# Patient Record
Sex: FEMALE | Race: WHITE | NOT HISPANIC OR LATINO | Employment: PART TIME | URBAN - METROPOLITAN AREA
[De-identification: names, ages, dates, MRNs, and addresses within clinical notes are randomized per-mention and may not be internally consistent; named-entity substitution may affect disease eponyms.]

---

## 2018-05-08 ENCOUNTER — OFFICE VISIT - HEALTHEAST (OUTPATIENT)
Dept: FAMILY MEDICINE | Facility: CLINIC | Age: 63
End: 2018-05-08

## 2018-05-08 ENCOUNTER — COMMUNICATION - HEALTHEAST (OUTPATIENT)
Dept: TELEHEALTH | Facility: CLINIC | Age: 63
End: 2018-05-08

## 2018-05-08 ENCOUNTER — HOSPITAL ENCOUNTER (OUTPATIENT)
Dept: MAMMOGRAPHY | Facility: CLINIC | Age: 63
Discharge: HOME | End: 2018-05-08

## 2018-05-08 DIAGNOSIS — Z13.1 SCREENING FOR DIABETES MELLITUS: ICD-10-CM

## 2018-05-08 DIAGNOSIS — Z12.31 VISIT FOR SCREENING MAMMOGRAM: ICD-10-CM

## 2018-05-08 DIAGNOSIS — Z13.220 SCREENING FOR LIPID DISORDERS: ICD-10-CM

## 2018-05-08 DIAGNOSIS — E66.9 OBESITY: ICD-10-CM

## 2018-05-08 DIAGNOSIS — R07.9 CHEST PAIN: ICD-10-CM

## 2018-05-08 DIAGNOSIS — Z00.00 ROUTINE HEALTH MAINTENANCE: ICD-10-CM

## 2018-05-08 LAB
ANION GAP SERPL CALCULATED.3IONS-SCNC: 10 MMOL/L (ref 5–18)
BUN SERPL-MCNC: 22 MG/DL (ref 8–22)
CALCIUM SERPL-MCNC: 9.6 MG/DL (ref 8.5–10.5)
CHLORIDE BLD-SCNC: 106 MMOL/L (ref 98–107)
CHOLEST SERPL-MCNC: 230 MG/DL
CO2 SERPL-SCNC: 24 MMOL/L (ref 22–31)
CREAT SERPL-MCNC: 0.95 MG/DL (ref 0.6–1.1)
FASTING STATUS PATIENT QL REPORTED: YES
GFR SERPL CREATININE-BSD FRML MDRD: 60 ML/MIN/1.73M2
GLUCOSE BLD-MCNC: 87 MG/DL (ref 70–125)
HDLC SERPL-MCNC: 62 MG/DL
LDLC SERPL CALC-MCNC: 143 MG/DL
POTASSIUM BLD-SCNC: 5.3 MMOL/L (ref 3.5–5)
SODIUM SERPL-SCNC: 140 MMOL/L (ref 136–145)
TRIGL SERPL-MCNC: 125 MG/DL

## 2018-05-08 ASSESSMENT — MIFFLIN-ST. JEOR: SCORE: 1850.9

## 2019-07-01 ENCOUNTER — OFFICE VISIT - HEALTHEAST (OUTPATIENT)
Dept: FAMILY MEDICINE | Facility: CLINIC | Age: 64
End: 2019-07-01

## 2019-07-01 DIAGNOSIS — J06.9 UPPER RESPIRATORY TRACT INFECTION, UNSPECIFIED TYPE: ICD-10-CM

## 2019-07-03 ENCOUNTER — AMBULATORY - HEALTHEAST (OUTPATIENT)
Dept: OTHER | Facility: CLINIC | Age: 64
End: 2019-07-03

## 2019-12-22 ENCOUNTER — OFFICE VISIT - HEALTHEAST (OUTPATIENT)
Dept: FAMILY MEDICINE | Facility: CLINIC | Age: 64
End: 2019-12-22

## 2019-12-22 DIAGNOSIS — Z23 NEED FOR INFLUENZA VACCINATION: ICD-10-CM

## 2019-12-22 DIAGNOSIS — N39.41 URGE INCONTINENCE OF URINE: ICD-10-CM

## 2019-12-22 DIAGNOSIS — N30.00 ACUTE CYSTITIS WITHOUT HEMATURIA: ICD-10-CM

## 2019-12-22 LAB
ALBUMIN UR-MCNC: NEGATIVE MG/DL
APPEARANCE UR: ABNORMAL
BACTERIA #/AREA URNS HPF: ABNORMAL HPF
BILIRUB UR QL STRIP: NEGATIVE
COLOR UR AUTO: YELLOW
GLUCOSE UR STRIP-MCNC: NEGATIVE MG/DL
HGB UR QL STRIP: ABNORMAL
KETONES UR STRIP-MCNC: NEGATIVE MG/DL
LEUKOCYTE ESTERASE UR QL STRIP: ABNORMAL
NITRATE UR QL: POSITIVE
PH UR STRIP: 6 [PH] (ref 5–8)
RBC #/AREA URNS AUTO: ABNORMAL HPF
SP GR UR STRIP: 1.02 (ref 1–1.03)
SQUAMOUS #/AREA URNS AUTO: ABNORMAL LPF
UROBILINOGEN UR STRIP-ACNC: ABNORMAL
WBC #/AREA URNS AUTO: ABNORMAL HPF

## 2019-12-24 LAB — BACTERIA SPEC CULT: ABNORMAL

## 2020-10-28 ENCOUNTER — OFFICE VISIT - HEALTHEAST (OUTPATIENT)
Dept: INTERNAL MEDICINE | Facility: CLINIC | Age: 65
End: 2020-10-28

## 2020-10-28 DIAGNOSIS — R03.0 ELEVATED BLOOD PRESSURE READING WITHOUT DIAGNOSIS OF HYPERTENSION: ICD-10-CM

## 2020-10-28 DIAGNOSIS — Z86.19 H/O COLD SORES: ICD-10-CM

## 2020-10-28 DIAGNOSIS — E66.01 MORBID OBESITY (H): ICD-10-CM

## 2020-10-28 ASSESSMENT — PATIENT HEALTH QUESTIONNAIRE - PHQ9: SUM OF ALL RESPONSES TO PHQ QUESTIONS 1-9: 0

## 2020-10-30 ENCOUNTER — COMMUNICATION - HEALTHEAST (OUTPATIENT)
Dept: INTERNAL MEDICINE | Facility: CLINIC | Age: 65
End: 2020-10-30

## 2020-10-30 LAB
HSV1 IGG SERPL QL IA: POSITIVE
HSV2 IGG SERPL QL IA: ABNORMAL

## 2020-11-11 ENCOUNTER — OFFICE VISIT - HEALTHEAST (OUTPATIENT)
Dept: INTERNAL MEDICINE | Facility: CLINIC | Age: 65
End: 2020-11-11

## 2020-11-11 DIAGNOSIS — I10 ESSENTIAL HYPERTENSION: ICD-10-CM

## 2020-11-11 DIAGNOSIS — H61.22 IMPACTED CERUMEN OF LEFT EAR: ICD-10-CM

## 2020-12-02 ENCOUNTER — COMMUNICATION - HEALTHEAST (OUTPATIENT)
Dept: INTERNAL MEDICINE | Facility: CLINIC | Age: 65
End: 2020-12-02

## 2020-12-02 DIAGNOSIS — I10 ESSENTIAL HYPERTENSION: ICD-10-CM

## 2021-01-21 ENCOUNTER — HOSPITAL ENCOUNTER (OUTPATIENT)
Dept: MAMMOGRAPHY | Facility: CLINIC | Age: 66
Discharge: HOME | End: 2021-01-21

## 2021-01-21 DIAGNOSIS — Z12.31 VISIT FOR SCREENING MAMMOGRAM: ICD-10-CM

## 2021-01-25 ENCOUNTER — HOSPITAL ENCOUNTER (OUTPATIENT)
Dept: MAMMOGRAPHY | Facility: CLINIC | Age: 66
Discharge: HOME | End: 2021-01-25

## 2021-01-25 ENCOUNTER — AMBULATORY - HEALTHEAST (OUTPATIENT)
Dept: MAMMOGRAPHY | Facility: CLINIC | Age: 66
End: 2021-01-25

## 2021-01-25 DIAGNOSIS — R92.0 BREAST MICROCALCIFICATIONS: ICD-10-CM

## 2021-01-25 DIAGNOSIS — Z11.59 ENCOUNTER FOR SCREENING FOR OTHER VIRAL DISEASES: ICD-10-CM

## 2021-01-29 ENCOUNTER — AMBULATORY - HEALTHEAST (OUTPATIENT)
Dept: FAMILY MEDICINE | Facility: CLINIC | Age: 66
End: 2021-01-29

## 2021-01-29 DIAGNOSIS — Z11.59 ENCOUNTER FOR SCREENING FOR OTHER VIRAL DISEASES: ICD-10-CM

## 2021-01-30 ENCOUNTER — COMMUNICATION - HEALTHEAST (OUTPATIENT)
Dept: SCHEDULING | Facility: CLINIC | Age: 66
End: 2021-01-30

## 2021-01-30 LAB
SARS-COV-2 PCR COMMENT: NORMAL
SARS-COV-2 RNA SPEC QL NAA+PROBE: NEGATIVE
SARS-COV-2 VIRUS SPECIMEN SOURCE: NORMAL

## 2021-02-01 ENCOUNTER — HOSPITAL ENCOUNTER (OUTPATIENT)
Dept: MAMMOGRAPHY | Facility: CLINIC | Age: 66
Discharge: HOME | End: 2021-02-01

## 2021-02-01 DIAGNOSIS — R92.0 BREAST MICROCALCIFICATIONS: ICD-10-CM

## 2021-02-02 ENCOUNTER — COMMUNICATION - HEALTHEAST (OUTPATIENT)
Dept: ONCOLOGY | Facility: HOSPITAL | Age: 66
End: 2021-02-02

## 2021-02-02 LAB
LAB AP CHARGES (HE HISTORICAL CONVERSION): NORMAL
PATH REPORT.COMMENTS IMP SPEC: NORMAL
PATH REPORT.COMMENTS IMP SPEC: NORMAL
PATH REPORT.FINAL DX SPEC: NORMAL
PATH REPORT.GROSS SPEC: NORMAL
PATH REPORT.MICROSCOPIC SPEC OTHER STN: NORMAL
PATH REPORT.RELEVANT HX SPEC: NORMAL
RESULT FLAG (HE HISTORICAL CONVERSION): NORMAL

## 2021-02-08 ENCOUNTER — HOSPITAL ENCOUNTER (OUTPATIENT)
Dept: SURGERY | Facility: CLINIC | Age: 66
Discharge: HOME | End: 2021-02-08
Attending: SURGERY

## 2021-02-08 DIAGNOSIS — D05.12 NEOPLASM OF LEFT BREAST, PRIMARY TUMOR STAGING CATEGORY TIS: DUCTAL CARCINOMA IN SITU (DCIS): ICD-10-CM

## 2021-02-08 ASSESSMENT — MIFFLIN-ST. JEOR: SCORE: 1821.42

## 2021-02-09 ENCOUNTER — AMBULATORY - HEALTHEAST (OUTPATIENT)
Dept: SURGERY | Facility: CLINIC | Age: 66
End: 2021-02-09

## 2021-02-09 ENCOUNTER — COMMUNICATION - HEALTHEAST (OUTPATIENT)
Dept: SURGERY | Facility: CLINIC | Age: 66
End: 2021-02-09

## 2021-02-09 DIAGNOSIS — Z11.59 ENCOUNTER FOR SCREENING FOR OTHER VIRAL DISEASES: ICD-10-CM

## 2021-02-15 ENCOUNTER — COMMUNICATION - HEALTHEAST (OUTPATIENT)
Dept: SURGERY | Facility: CLINIC | Age: 66
End: 2021-02-15

## 2021-02-17 ENCOUNTER — COMMUNICATION - HEALTHEAST (OUTPATIENT)
Dept: SURGERY | Facility: CLINIC | Age: 66
End: 2021-02-17

## 2021-02-18 ENCOUNTER — COMMUNICATION - HEALTHEAST (OUTPATIENT)
Dept: SURGERY | Facility: CLINIC | Age: 66
End: 2021-02-18

## 2021-02-18 ENCOUNTER — AMBULATORY - HEALTHEAST (OUTPATIENT)
Dept: NURSING | Facility: CLINIC | Age: 66
End: 2021-02-18

## 2021-02-18 ENCOUNTER — OFFICE VISIT - HEALTHEAST (OUTPATIENT)
Dept: FAMILY MEDICINE | Facility: CLINIC | Age: 66
End: 2021-02-18

## 2021-02-18 DIAGNOSIS — I10 ESSENTIAL HYPERTENSION: ICD-10-CM

## 2021-02-18 DIAGNOSIS — E66.01 MORBID OBESITY (H): ICD-10-CM

## 2021-02-18 DIAGNOSIS — I45.10 RBBB: ICD-10-CM

## 2021-02-18 DIAGNOSIS — Z01.818 PREOP GENERAL PHYSICAL EXAM: ICD-10-CM

## 2021-02-18 DIAGNOSIS — D05.12 NEOPLASM OF LEFT BREAST, PRIMARY TUMOR STAGING CATEGORY TIS: DUCTAL CARCINOMA IN SITU (DCIS): ICD-10-CM

## 2021-02-18 LAB
ANION GAP SERPL CALCULATED.3IONS-SCNC: 8 MMOL/L (ref 5–18)
ATRIAL RATE - MUSE: 79 BPM
BUN SERPL-MCNC: 16 MG/DL (ref 8–22)
CALCIUM SERPL-MCNC: 8.8 MG/DL (ref 8.5–10.5)
CHLORIDE BLD-SCNC: 105 MMOL/L (ref 98–107)
CO2 SERPL-SCNC: 26 MMOL/L (ref 22–31)
CREAT SERPL-MCNC: 1.02 MG/DL (ref 0.6–1.1)
DIASTOLIC BLOOD PRESSURE - MUSE: NORMAL
ERYTHROCYTE [DISTWIDTH] IN BLOOD BY AUTOMATED COUNT: 12.7 % (ref 11–14.5)
GFR SERPL CREATININE-BSD FRML MDRD: 55 ML/MIN/1.73M2
GLUCOSE BLD-MCNC: 84 MG/DL (ref 70–125)
HCT VFR BLD AUTO: 44 % (ref 35–47)
HGB BLD-MCNC: 14.2 G/DL (ref 12–16)
INTERPRETATION ECG - MUSE: NORMAL
MCH RBC QN AUTO: 29.8 PG (ref 27–34)
MCHC RBC AUTO-ENTMCNC: 32.3 G/DL (ref 32–36)
MCV RBC AUTO: 92 FL (ref 80–100)
P AXIS - MUSE: 37 DEGREES
PLATELET # BLD AUTO: 297 THOU/UL (ref 140–440)
PMV BLD AUTO: 10.5 FL (ref 7–10)
POTASSIUM BLD-SCNC: 4.9 MMOL/L (ref 3.5–5)
PR INTERVAL - MUSE: 134 MS
QRS DURATION - MUSE: 130 MS
QT - MUSE: 390 MS
QTC - MUSE: 447 MS
R AXIS - MUSE: 5 DEGREES
RBC # BLD AUTO: 4.77 MILL/UL (ref 3.8–5.4)
SODIUM SERPL-SCNC: 139 MMOL/L (ref 136–145)
SYSTOLIC BLOOD PRESSURE - MUSE: NORMAL
T AXIS - MUSE: 11 DEGREES
VENTRICULAR RATE- MUSE: 79 BPM
WBC: 5.7 THOU/UL (ref 4–11)

## 2021-02-18 ASSESSMENT — MIFFLIN-ST. JEOR: SCORE: 1823.68

## 2021-02-19 ENCOUNTER — COMMUNICATION - HEALTHEAST (OUTPATIENT)
Dept: SURGERY | Facility: CLINIC | Age: 66
End: 2021-02-19

## 2021-02-19 ENCOUNTER — AMBULATORY - HEALTHEAST (OUTPATIENT)
Dept: MAMMOGRAPHY | Facility: CLINIC | Age: 66
End: 2021-02-19

## 2021-02-19 DIAGNOSIS — Z11.59 ENCOUNTER FOR SCREENING FOR OTHER VIRAL DISEASES: ICD-10-CM

## 2021-02-22 ENCOUNTER — AMBULATORY - HEALTHEAST (OUTPATIENT)
Dept: LAB | Facility: CLINIC | Age: 66
End: 2021-02-22

## 2021-02-22 ENCOUNTER — COMMUNICATION - HEALTHEAST (OUTPATIENT)
Dept: SURGERY | Facility: CLINIC | Age: 66
End: 2021-02-22

## 2021-02-22 DIAGNOSIS — Z11.59 ENCOUNTER FOR SCREENING FOR OTHER VIRAL DISEASES: ICD-10-CM

## 2021-02-23 ENCOUNTER — COMMUNICATION - HEALTHEAST (OUTPATIENT)
Dept: SCHEDULING | Facility: CLINIC | Age: 66
End: 2021-02-23

## 2021-02-25 ENCOUNTER — COMMUNICATION - HEALTHEAST (OUTPATIENT)
Dept: INTERNAL MEDICINE | Facility: CLINIC | Age: 66
End: 2021-02-25

## 2021-02-25 ENCOUNTER — ANESTHESIA - HEALTHEAST (OUTPATIENT)
Dept: SURGERY | Facility: AMBULATORY SURGERY CENTER | Age: 66
End: 2021-02-25

## 2021-02-25 DIAGNOSIS — I10 ESSENTIAL HYPERTENSION: ICD-10-CM

## 2021-02-25 RX ORDER — LISINOPRIL 5 MG/1
TABLET ORAL
Qty: 90 TABLET | Refills: 3 | Status: SHIPPED | OUTPATIENT
Start: 2021-02-25 | End: 2022-02-09

## 2021-02-25 ASSESSMENT — MIFFLIN-ST. JEOR: SCORE: 1821.42

## 2021-02-26 ENCOUNTER — HOSPITAL ENCOUNTER (OUTPATIENT)
Dept: MAMMOGRAPHY | Facility: CLINIC | Age: 66
Discharge: HOME | End: 2021-02-26
Attending: SURGERY

## 2021-02-26 ENCOUNTER — SURGERY - HEALTHEAST (OUTPATIENT)
Dept: SURGERY | Facility: AMBULATORY SURGERY CENTER | Age: 66
End: 2021-02-26

## 2021-02-26 DIAGNOSIS — D05.12 NEOPLASM OF LEFT BREAST, PRIMARY TUMOR STAGING CATEGORY TIS: DUCTAL CARCINOMA IN SITU (DCIS): ICD-10-CM

## 2021-02-26 ASSESSMENT — MIFFLIN-ST. JEOR: SCORE: 1821.42

## 2021-03-15 ENCOUNTER — RECORDS - HEALTHEAST (OUTPATIENT)
Dept: ADMINISTRATIVE | Facility: OTHER | Age: 66
End: 2021-03-15

## 2021-03-15 ENCOUNTER — COMMUNICATION - HEALTHEAST (OUTPATIENT)
Dept: ONCOLOGY | Facility: CLINIC | Age: 66
End: 2021-03-15

## 2021-03-15 ENCOUNTER — HOSPITAL ENCOUNTER (OUTPATIENT)
Dept: SURGERY | Facility: CLINIC | Age: 66
Discharge: HOME | End: 2021-03-15
Attending: SURGERY

## 2021-03-15 DIAGNOSIS — D05.12 BREAST NEOPLASM, TIS (DCIS), LEFT: ICD-10-CM

## 2021-03-16 ENCOUNTER — AMBULATORY - HEALTHEAST (OUTPATIENT)
Dept: NURSING | Facility: CLINIC | Age: 66
End: 2021-03-16

## 2021-03-24 ENCOUNTER — OFFICE VISIT - HEALTHEAST (OUTPATIENT)
Dept: RADIATION ONCOLOGY | Facility: CLINIC | Age: 66
End: 2021-03-24

## 2021-03-24 ENCOUNTER — COMMUNICATION - HEALTHEAST (OUTPATIENT)
Dept: ONCOLOGY | Facility: CLINIC | Age: 66
End: 2021-03-24

## 2021-03-24 ENCOUNTER — OFFICE VISIT - HEALTHEAST (OUTPATIENT)
Dept: ONCOLOGY | Facility: CLINIC | Age: 66
End: 2021-03-24

## 2021-03-24 DIAGNOSIS — D05.12 NEOPLASM OF LEFT BREAST, PRIMARY TUMOR STAGING CATEGORY TIS: DUCTAL CARCINOMA IN SITU (DCIS): ICD-10-CM

## 2021-03-24 DIAGNOSIS — D05.12 BREAST NEOPLASM, TIS (DCIS), LEFT: ICD-10-CM

## 2021-03-24 DIAGNOSIS — Z78.0 MENOPAUSE: ICD-10-CM

## 2021-03-24 ASSESSMENT — MIFFLIN-ST. JEOR: SCORE: 1817.79

## 2021-03-25 ENCOUNTER — AMBULATORY - HEALTHEAST (OUTPATIENT)
Dept: ONCOLOGY | Facility: CLINIC | Age: 66
End: 2021-03-25

## 2021-03-25 ENCOUNTER — COMMUNICATION - HEALTHEAST (OUTPATIENT)
Dept: ONCOLOGY | Facility: CLINIC | Age: 66
End: 2021-03-25

## 2021-03-25 ENCOUNTER — AMBULATORY - HEALTHEAST (OUTPATIENT)
Dept: RADIATION ONCOLOGY | Facility: HOSPITAL | Age: 66
End: 2021-03-25

## 2021-03-25 DIAGNOSIS — D05.12 NEOPLASM OF LEFT BREAST, PRIMARY TUMOR STAGING CATEGORY TIS: DUCTAL CARCINOMA IN SITU (DCIS): ICD-10-CM

## 2021-03-29 ENCOUNTER — AMBULATORY - HEALTHEAST (OUTPATIENT)
Dept: RADIATION ONCOLOGY | Facility: HOSPITAL | Age: 66
End: 2021-03-29

## 2021-03-31 ENCOUNTER — OFFICE VISIT - HEALTHEAST (OUTPATIENT)
Dept: ONCOLOGY | Facility: HOSPITAL | Age: 66
End: 2021-03-31

## 2021-03-31 DIAGNOSIS — F43.23 ADJUSTMENT DISORDER WITH MIXED ANXIETY AND DEPRESSED MOOD: ICD-10-CM

## 2021-03-31 DIAGNOSIS — D05.12 BREAST NEOPLASM, TIS (DCIS), LEFT: ICD-10-CM

## 2021-04-03 ENCOUNTER — AMBULATORY - HEALTHEAST (OUTPATIENT)
Dept: FAMILY MEDICINE | Facility: CLINIC | Age: 66
End: 2021-04-03

## 2021-04-03 ENCOUNTER — COMMUNICATION - HEALTHEAST (OUTPATIENT)
Dept: ONCOLOGY | Facility: HOSPITAL | Age: 66
End: 2021-04-03

## 2021-04-03 DIAGNOSIS — D05.12 NEOPLASM OF LEFT BREAST, PRIMARY TUMOR STAGING CATEGORY TIS: DUCTAL CARCINOMA IN SITU (DCIS): ICD-10-CM

## 2021-04-05 ENCOUNTER — COMMUNICATION - HEALTHEAST (OUTPATIENT)
Dept: SCHEDULING | Facility: CLINIC | Age: 66
End: 2021-04-05

## 2021-04-06 ENCOUNTER — OFFICE VISIT - HEALTHEAST (OUTPATIENT)
Dept: RADIATION ONCOLOGY | Facility: CLINIC | Age: 66
End: 2021-04-06

## 2021-04-06 ENCOUNTER — COMMUNICATION - HEALTHEAST (OUTPATIENT)
Dept: ONCOLOGY | Facility: CLINIC | Age: 66
End: 2021-04-06

## 2021-04-06 DIAGNOSIS — D05.12 NEOPLASM OF LEFT BREAST, PRIMARY TUMOR STAGING CATEGORY TIS: DUCTAL CARCINOMA IN SITU (DCIS): ICD-10-CM

## 2021-04-08 ENCOUNTER — OFFICE VISIT - HEALTHEAST (OUTPATIENT)
Dept: ONCOLOGY | Facility: CLINIC | Age: 66
End: 2021-04-08

## 2021-04-08 DIAGNOSIS — D05.12 BREAST NEOPLASM, TIS (DCIS), LEFT: ICD-10-CM

## 2021-04-13 ENCOUNTER — OFFICE VISIT - HEALTHEAST (OUTPATIENT)
Dept: RADIATION ONCOLOGY | Facility: CLINIC | Age: 66
End: 2021-04-13

## 2021-04-13 DIAGNOSIS — D05.12 NEOPLASM OF LEFT BREAST, PRIMARY TUMOR STAGING CATEGORY TIS: DUCTAL CARCINOMA IN SITU (DCIS): ICD-10-CM

## 2021-04-15 ENCOUNTER — COMMUNICATION - HEALTHEAST (OUTPATIENT)
Dept: ONCOLOGY | Facility: CLINIC | Age: 66
End: 2021-04-15

## 2021-04-15 DIAGNOSIS — D05.12 BREAST NEOPLASM, TIS (DCIS), LEFT: ICD-10-CM

## 2021-04-20 ENCOUNTER — OFFICE VISIT - HEALTHEAST (OUTPATIENT)
Dept: RADIATION ONCOLOGY | Facility: CLINIC | Age: 66
End: 2021-04-20

## 2021-04-20 DIAGNOSIS — D05.12 NEOPLASM OF LEFT BREAST, PRIMARY TUMOR STAGING CATEGORY TIS: DUCTAL CARCINOMA IN SITU (DCIS): ICD-10-CM

## 2021-04-27 ENCOUNTER — OFFICE VISIT - HEALTHEAST (OUTPATIENT)
Dept: RADIATION ONCOLOGY | Facility: CLINIC | Age: 66
End: 2021-04-27

## 2021-04-27 DIAGNOSIS — D05.12 NEOPLASM OF LEFT BREAST, PRIMARY TUMOR STAGING CATEGORY TIS: DUCTAL CARCINOMA IN SITU (DCIS): ICD-10-CM

## 2021-04-28 ENCOUNTER — COMMUNICATION - HEALTHEAST (OUTPATIENT)
Dept: RADIATION ONCOLOGY | Facility: CLINIC | Age: 66
End: 2021-04-28

## 2021-04-28 ENCOUNTER — AMBULATORY - HEALTHEAST (OUTPATIENT)
Dept: RADIATION ONCOLOGY | Facility: CLINIC | Age: 66
End: 2021-04-28

## 2021-05-03 ENCOUNTER — AMBULATORY - HEALTHEAST (OUTPATIENT)
Dept: RADIATION ONCOLOGY | Facility: CLINIC | Age: 66
End: 2021-05-03

## 2021-05-06 ENCOUNTER — COMMUNICATION - HEALTHEAST (OUTPATIENT)
Dept: ONCOLOGY | Facility: CLINIC | Age: 66
End: 2021-05-06

## 2021-05-06 ENCOUNTER — AMBULATORY - HEALTHEAST (OUTPATIENT)
Dept: ONCOLOGY | Facility: CLINIC | Age: 66
End: 2021-05-06

## 2021-05-11 ENCOUNTER — COMMUNICATION - HEALTHEAST (OUTPATIENT)
Dept: RADIATION ONCOLOGY | Facility: CLINIC | Age: 66
End: 2021-05-11

## 2021-05-11 ENCOUNTER — COMMUNICATION - HEALTHEAST (OUTPATIENT)
Dept: ONCOLOGY | Facility: CLINIC | Age: 66
End: 2021-05-11

## 2021-05-11 DIAGNOSIS — D05.12 BREAST NEOPLASM, TIS (DCIS), LEFT: ICD-10-CM

## 2021-05-27 ASSESSMENT — PATIENT HEALTH QUESTIONNAIRE - PHQ9: SUM OF ALL RESPONSES TO PHQ QUESTIONS 1-9: 0

## 2021-05-30 NOTE — PROGRESS NOTES
Assessment/Plan:     1. Upper respiratory tract infection, unspecified type  Symptoms likely viral at this point.  I recommend rest and plenty of fluids.  I did prescribe Tessalon and codeine cough syrup for day and nighttime coughing.  If her symptoms worsen over the next couple of days or do not improve in the next 4 to 5 days, I did write her prescription for a azithromycin to fill.  She will take this for 5 days.  Certainly recommend following up with any fevers, worsening cough, or shortness of breath.  - azithromycin (ZITHROMAX Z-ANTHONY) 250 MG tablet; Take 2 tablets (500 mg) on  Day 1,  followed by 1 tablet (250 mg) once daily on Days 2 through 5.  Dispense: 6 tablet; Refill: 0  - benzonatate (TESSALON) 200 MG capsule; Take 1 capsule (200 mg total) by mouth 3 (three) times a day as needed for cough.  Dispense: 30 capsule; Refill: 0  - codeine-guaiFENesin (GUAIFENESIN AC)  mg/5 mL liquid; Take 5 mL by mouth 2 (two) times a day as needed for cough.  Dispense: 240 mL; Refill: 0        Subjective:     Zaina Cortez is a 64 y.o. female who presents with complaints of cough and sinus congestion x1 week.  Cough is productive, especially in the morning.  She denies any shortness of breath, wheezing, or chest pain.  She is feeling significantly congested in her sinuses.  She is producing a small amount of drainage.  She has some ear discomfort.  No sore throat.  Denies any fevers, chills, or body aches.  Using over-the-counter Sudafed without significant improvement.      The following portions of the patient's history were reviewed and updated as appropriate: allergies, current medications.    Review of Systems  A comprehensive review of systems was performed and was otherwise negative    Objective:     /76   Pulse 75   Temp 98.2  F (36.8  C)   Wt (!) 289 lb 4.8 oz (131.2 kg)   SpO2 96%   BMI 48.14 kg/m      General Appearance: Alert, cooperative, no distress, appears stated age  Ears: Normal TM's and  external ear canals, both ears  Nose: Nares normal, septum midline, mucosa edematous, clear drainage  Throat: Lips, mucosa, and tongue normal; teeth and gums normal  Neck: Supple, symmetrical, trachea midline, no adenopathy  Lungs: Clear to auscultation bilaterally, respirations unlabored  Heart: Regular rate and rhythm, S1 and S2 normal, no murmur, rub, or gallop    Hortencia Wagstrom, NP

## 2021-05-31 ENCOUNTER — RECORDS - HEALTHEAST (OUTPATIENT)
Dept: ADMINISTRATIVE | Facility: CLINIC | Age: 66
End: 2021-05-31

## 2021-06-01 ENCOUNTER — COMMUNICATION - HEALTHEAST (OUTPATIENT)
Dept: ONCOLOGY | Facility: CLINIC | Age: 66
End: 2021-06-01

## 2021-06-01 VITALS — WEIGHT: 285.5 LBS | BODY MASS INDEX: 47.57 KG/M2 | HEIGHT: 65 IN

## 2021-06-03 VITALS — WEIGHT: 289.3 LBS | BODY MASS INDEX: 48.14 KG/M2

## 2021-06-04 VITALS
HEART RATE: 66 BPM | SYSTOLIC BLOOD PRESSURE: 148 MMHG | BODY MASS INDEX: 45.26 KG/M2 | DIASTOLIC BLOOD PRESSURE: 96 MMHG | RESPIRATION RATE: 16 BRPM | TEMPERATURE: 97.9 F | OXYGEN SATURATION: 95 % | WEIGHT: 272 LBS

## 2021-06-04 NOTE — PROGRESS NOTES
Assessment and Plan     Zaina was seen today for urinary tract infection.    Diagnoses and all orders for this visit:    Acute cystitis without hematuria  -     nitrofurantoin, macrocrystal-monohydrate, (MACROBID) 100 MG capsule; Take 1 capsule (100 mg total) by mouth 2 (two) times a day for 5 days.    Urge incontinence of urine  -     Urinalysis-UC if Indicated  -     Culture, Urine    Need for influenza vaccination  -     Cancel: Influenza, Seasonal Quad, PF =/> 6months  -     Influenza, Seasonal Quad, PF =/> 6months         HPI     Chief Complaint   Patient presents with     Urinary Tract Infection     burning with urination, 2-3 days,        Zaina Cortez is a 64 y.o. female seen today for dysuria.     Dysuria for 2-3 days.  No frequency, urgency, fever, chills, nausea, abdominal pain, back/flank pain.       Current Outpatient Medications:      benzonatate (TESSALON) 200 MG capsule, Take 1 capsule (200 mg total) by mouth 3 (three) times a day as needed for cough., Disp: 30 capsule, Rfl: 0     codeine-guaiFENesin (GUAIFENESIN AC)  mg/5 mL liquid, Take 5 mL by mouth 2 (two) times a day as needed for cough., Disp: 240 mL, Rfl: 0     nitrofurantoin, macrocrystal-monohydrate, (MACROBID) 100 MG capsule, Take 1 capsule (100 mg total) by mouth 2 (two) times a day for 5 days., Disp: 10 capsule, Rfl: 0     Reviewed and updated: medical history, medications and allergies.     Review of Systems     General: Denies fever, chills, fatigue.  Cardiovascular: Denies chest pain, dyspnea on exertion, palpitations.  Respiratory: Denies dyspnea, cough, wheezing.  GI: Denies nausea, vomiting, diarrhea, constipation.  : Dysuria without frequency or urgency.     Objective     Vitals:    12/22/19 1242   BP: (!) 148/96   Patient Site: Right Arm   Patient Position: Sitting   Cuff Size: Adult Large   Pulse: 66   Resp: 16   Temp: 97.9  F (36.6  C)   TempSrc: Oral   SpO2: 95%   Weight: (!) 272 lb (123.4 kg)        Reviewed vital  signs.  General: Appears calm, comfortable. Answers questions quickly and appropriately with clear speech. No apparent distress.  Skin: Pink, warm, dry.  HENT: Normocephalic, atraumatic.  Neck: Supple.  Cardiovascular: Strong, regular radial pulse.  Respiratory: Normal respiratory effort.  Abdomen: Soft, flat, non-tender. No rashes or lesions. No splenomegaly or hepatomegaly.  No CVA tenderness to percussion.  Neuro: Memory and cognition appear normal. Normal gait.  Psych: Mood and affect appear normal.     Imaging:   No results found.    Labs:  Recent Results (from the past 24 hour(s))   Urinalysis-UC if Indicated   Result Value Ref Range    Color, UA Yellow Colorless, Yellow, Straw, Light Yellow    Clarity, UA Cloudy (!) Clear    Glucose, UA Negative Negative    Bilirubin, UA Negative Negative    Ketones, UA Negative Negative    Specific Gravity, UA 1.020 1.005 - 1.030    Blood, UA Trace (!) Negative    pH, UA 6.0 5.0 - 8.0    Protein, UA Negative Negative mg/dL    Urobilinogen, UA 0.2 E.U./dL 0.2 E.U./dL, 1.0 E.U./dL    Nitrite, UA Positive (!) Negative    Leukocytes, UA Small (!) Negative    Bacteria, UA Many (!) None Seen hpf    RBC, UA None Seen None Seen, 0-2 hpf    WBC, UA 0-5 None Seen, 0-5 hpf    Squam Epithel, UA 0-5 None Seen, 0-5 lpf        Medical Decision-Making     Zaina is a generally well-appearing 62-year-old female with no significant medical history presents with dysuria without frequency or urgency for the last 2 or 3 days.  No abdominal pain, fever, chills, nausea, or CVA pain/tenderness.  Appearance is nontoxic.  She is not tachycardic, tachypneic, hypoxic, or febrile.  UA is positive for nitrites and copious bacteria but no pyuria.  Will treat with a course of nitrofurantoin.    Reviewed red flags that would trigger a prompt return to the clinic as noted below under patient instructions.  She expressed understanding of these directions and is in agreement with the plan.     Patient  Instructions     Patient Instructions   Please return to the clinic if you notice any of the following:    Fever / chills.    Back or flank pain.    Nausea.    Symptoms persist beyond 72 hours after you start treatment.          Discussed benefit vs risk of medications, dosing, side effects.  Patient was able to verbalize understanding.  After visit summary was provided for patient.     Wisam Granda PA-C

## 2021-06-04 NOTE — PATIENT INSTRUCTIONS - HE
Please return to the clinic if you notice any of the following:    Fever / chills.    Back or flank pain.    Nausea.    Symptoms persist beyond 72 hours after you start treatment.

## 2021-06-05 VITALS
HEART RATE: 94 BPM | TEMPERATURE: 97.9 F | DIASTOLIC BLOOD PRESSURE: 84 MMHG | BODY MASS INDEX: 46.59 KG/M2 | SYSTOLIC BLOOD PRESSURE: 140 MMHG | WEIGHT: 280 LBS | OXYGEN SATURATION: 96 %

## 2021-06-05 VITALS
BODY MASS INDEX: 46.41 KG/M2 | WEIGHT: 278.9 LBS | SYSTOLIC BLOOD PRESSURE: 162 MMHG | TEMPERATURE: 97.7 F | DIASTOLIC BLOOD PRESSURE: 96 MMHG | OXYGEN SATURATION: 98 % | HEART RATE: 72 BPM

## 2021-06-05 VITALS
DIASTOLIC BLOOD PRESSURE: 92 MMHG | SYSTOLIC BLOOD PRESSURE: 134 MMHG | OXYGEN SATURATION: 96 % | WEIGHT: 275 LBS | HEART RATE: 77 BPM | BODY MASS INDEX: 45.76 KG/M2

## 2021-06-05 VITALS
WEIGHT: 280 LBS | HEART RATE: 75 BPM | TEMPERATURE: 98.2 F | SYSTOLIC BLOOD PRESSURE: 177 MMHG | HEART RATE: 72 BPM | WEIGHT: 278.2 LBS | DIASTOLIC BLOOD PRESSURE: 93 MMHG | SYSTOLIC BLOOD PRESSURE: 142 MMHG | HEIGHT: 65 IN | BODY MASS INDEX: 46.59 KG/M2 | OXYGEN SATURATION: 97 % | TEMPERATURE: 98.2 F | BODY MASS INDEX: 46.35 KG/M2 | OXYGEN SATURATION: 97 % | DIASTOLIC BLOOD PRESSURE: 96 MMHG

## 2021-06-05 VITALS — BODY MASS INDEX: 46.38 KG/M2 | WEIGHT: 278.7 LBS

## 2021-06-05 VITALS — HEIGHT: 65 IN | WEIGHT: 279 LBS | BODY MASS INDEX: 46.48 KG/M2

## 2021-06-05 VITALS — BODY MASS INDEX: 46.48 KG/M2 | HEIGHT: 65 IN | WEIGHT: 279 LBS

## 2021-06-05 VITALS
DIASTOLIC BLOOD PRESSURE: 68 MMHG | HEART RATE: 76 BPM | HEIGHT: 65 IN | WEIGHT: 279.5 LBS | SYSTOLIC BLOOD PRESSURE: 132 MMHG | BODY MASS INDEX: 46.57 KG/M2

## 2021-06-05 VITALS
HEART RATE: 100 BPM | DIASTOLIC BLOOD PRESSURE: 104 MMHG | BODY MASS INDEX: 46.68 KG/M2 | TEMPERATURE: 97.6 F | SYSTOLIC BLOOD PRESSURE: 159 MMHG | WEIGHT: 280.5 LBS | OXYGEN SATURATION: 98 %

## 2021-06-05 VITALS
HEART RATE: 77 BPM | BODY MASS INDEX: 45.93 KG/M2 | OXYGEN SATURATION: 99 % | DIASTOLIC BLOOD PRESSURE: 87 MMHG | WEIGHT: 276 LBS | SYSTOLIC BLOOD PRESSURE: 151 MMHG

## 2021-06-09 ENCOUNTER — OFFICE VISIT - HEALTHEAST (OUTPATIENT)
Dept: RADIATION ONCOLOGY | Facility: CLINIC | Age: 66
End: 2021-06-09

## 2021-06-09 DIAGNOSIS — D05.12 NEOPLASM OF LEFT BREAST, PRIMARY TUMOR STAGING CATEGORY TIS: DUCTAL CARCINOMA IN SITU (DCIS): ICD-10-CM

## 2021-06-13 NOTE — TELEPHONE ENCOUNTER
Refilled medication.    [Time Spent: ___ minutes] : I have spent [unfilled] minutes of time on the encounter. [>50% of the face to face encounter time was spent on counseling and/or coordination of care for ___] : Greater than 50% of the face to face encounter time was spent on counseling and/or coordination of care for [unfilled]

## 2021-06-14 ENCOUNTER — COMMUNICATION - HEALTHEAST (OUTPATIENT)
Dept: ONCOLOGY | Facility: CLINIC | Age: 66
End: 2021-06-14

## 2021-06-14 DIAGNOSIS — D05.12 BREAST NEOPLASM, TIS (DCIS), LEFT: ICD-10-CM

## 2021-06-14 RX ORDER — ANASTROZOLE 1 MG/1
1 TABLET ORAL DAILY
Qty: 30 TABLET | Refills: 1 | Status: SHIPPED | OUTPATIENT
Start: 2021-06-14 | End: 2022-05-05

## 2021-06-14 NOTE — TELEPHONE ENCOUNTER
Telephoned and spoke with Zaina to share pathology results from her left breast stereotactic-guide needle core biopsy performed on 2/1/21 at [facility].  We discussed breast anatomy, histologic type, and next steps.  Estrogen and progesterone receptors have been deferred to excisional specimen.    Writer assisted Zaina in scheduling surgical consult with Dr. Joslyn Marie on 2/8/21.  She verbalized understanding of appointment details and location.  We reviewed what to expect at that appointment and the current visitor policy.    Zaina gave permission for resources about breast cancer to be sent to her e-mail and that has gone out.    Emotional support and encouragement provided and calls welcome.  I remain a resource to Zaina as needed. Alexandria Mitchell RN

## 2021-06-15 NOTE — PROGRESS NOTES
"Zaina presents to [facility] today for a surgical consult with Dr. Marie  regarding her newly diagnosed left breast cancer.  She is accompanied by her friend for consult.  RN assessment and EMR update. Resp 16   Ht 5' 5\" (1.651 m)   Wt (!) 279 lb (126.6 kg)   Breastfeeding No   BMI 46.43 kg/m    Patient given a Breast Cancer Packet, contents reviewed.  She met with Dr. Marie  see dictation for details of visit. She will plan a wire loc lumpectomy.  Pre and post op teaching complete. Julianna to call her for surgery scheduling.  Support provided, invited calls.  RN time 20 mins  "

## 2021-06-15 NOTE — PROGRESS NOTES
History:  Zaina Cortez is s/p left lumpectomy on 2/26/21 for DCIS.  She is doing well.  Never needed to take any medication after surgery.  Denies any swelling or issues.    Physical exam:  BREAST: Left breast incision is healing well since lumpectomy.  No signs of infection, or underlying seroma.  Dermabond over incision.    Pathology:  MICROSCOPIC AND DIAGNOSIS:   LEFT BREAST, ORIENTED, LUMPECTOMY:        1) DUCTAL CARCINOMA IN SITU (DCIS):             a) Nuclear grade: High (high, intermediate, low)             b) Patterns: Solid, and comedo with focal comedonecrosis             c) Size: 37 x 7 x 3 mm (measured and calculated in slides)   d) Margins: Uninvolved, but very close to, at 0.2 mm from the posterior margin, 5 mm from anterior and inferior margins, and at greater distance from other margins        2) Background breast shows non-proliferative fibrocystic changes with focal nodular stromal fibrosis        3) Prior biopsy site present, with cavity formation and organizing changes        4) Negative for invasive malignancy     PATHOLOGIC STAGE: pTis, pNx, pM-Not applicable     ASSESSMENT:  Zaina Cortez is s/p left lumpectomy for DCIS    - estrogen receptor 95%, progesterone receptor 5%    PLAN:  Okay for physical activities as tolerated  Pathology was discussed    - Utilized [facility] DCIS nomogram to review recurrence risk with further surgery, radiation and endocrine therapy    - Patient does not feel that re-excision would be worth the decrease in risk recurrence.  I agree.  Referrals were placed for medical and radiation oncology  Continue with yearly mammograms  Return to the breast clinic in 1 year, or earlier as needed    Joslyn Marie DO  General Surgeon  [facility]  Breast [address] [address]  Office: [phone]  Employed by - [facility]

## 2021-06-15 NOTE — TELEPHONE ENCOUNTER
Patient called, had questions about when radiation would be starting after her surgery.  Reviewed the usual timeline from when Dr. Marie places referrals to when radiation actually starts.     She also asked about the covid vaccine.  If she was able to get it, would that be ok with Dr. Marie.  Per Dr. Marie, it would be good to get the vaccine, but try to avoid 2-3 days before or after her surgery just in case she doesn't feel well afterwards.  Support provided, invited calls.

## 2021-06-15 NOTE — ANESTHESIA POSTPROCEDURE EVALUATION
Patient: Zaina Cortez  Procedure(s):  LEFT WIRE LOCALIZED LUMPECTOMY (Left)  Anesthesia type: general    Patient location: Phase II Recovery  Last vitals:   Vitals Value Taken Time   /72 02/26/21 1247   Temp 36.5  C (97.7  F) 02/26/21 1224   Pulse 83 02/26/21 1258   Resp 16 02/26/21 1247   SpO2 94 % 02/26/21 1258   Vitals shown include unvalidated device data.  Post vital signs: stable  Level of consciousness: awake and responds to simple questions  Post-anesthesia pain: pain controlled  Post-anesthesia nausea and vomiting: no  Pulmonary: unassisted, return to baseline  Cardiovascular: stable and blood pressure at baseline  Hydration: adequate  Anesthetic events: no    QCDR Measures:  ASA# 11 - Jaqui-op Cardiac Arrest: ASA11B - Patient did NOT experience unanticipated cardiac arrest  ASA# 12 - Jaqui-op Mortality Rate: ASA12B - Patient did NOT die  ASA# 13 - PACU Re-Intubation Rate: ASA13B - Patient did NOT require a new airway mgmt  ASA# 10 - Composite Anes Safety: ASA10A - No serious adverse event    Additional Notes:

## 2021-06-15 NOTE — TELEPHONE ENCOUNTER
"New Patient Oncology Nurse Navigator Note     Referring provider: Dr. Marie     Referring Clinic/Organization: [facility]     Referred to (specialty): Medical ad Radiation Oncology    Requested provider (if applicable): N/A     Date Referral Received: 3/16/2021     Evaluation for : Left Breast Cancer     Clinical History (per Nurse review of records provided):    -1/21/2021: Screening Mammogram at  showed, \"indeterminate calcifications in the left breast at the 2 o'clock position at anterior depth.  -1/25/2021: Diagnostic Left Mammogram showed, \"Suspicious calcifications within the left breast at the 3:00 position, middle depth. Recommend stereotactic guided biopsy of these calcifications for definitive pathologic diagnosis.\"  -2/1/2021: Left Breast Biopsy showed DCIS, grade 3, receptor  studies deferred to excisional specimen  -Surgical Consult with Dr. Marie on 2/8/2021 and proceeded to  Have left lumpectomy on 2/26/2021. Pathology showed, DCIS, size 37 mm, margins uninvolved, but very  close to, at 0.2 mm from the posterior margin. ER/NC+.  She was referred to medical and radiation oncology for further management.  -Follows with PCP at [facility]. See H & P from 2/18/2021 for medical and surgical history.  -Remote history ofmedical care at [facility] and [facility], see in CE. Confirmed that she has not had care after the dates listed in CE. Denies care at other health care organizations.    Clinical Assessment / Barriers to Care (Per Nurse): none     Records Location (Care Everywhere, Media, etc.): Epic,  and Media     Records Needed: none     Additional testing needed prior to consult: None  Called Zaina  to follow up and schedule new medical and radiartion oncology consulst as ordered by Dr. Marie. Consult with  Dr. Alvarez was scheduled on Wednesday, 3/24/2021, 10:30a.m. Consult was arranged with Radiation Oncologist, Dr. Purcell on the same day, 3/24/2021 at 1:00 p.m. Both appointments " were scheduled at [facility]. Patient will arrive wearing a face covering and voiced understanding of the visitor restriction in place due to the pandemic. She may have a supportive listen into consult by phone. New patient letter/map with appointment details, were sent to her confirmed e-mail address in Fnbox, for her reference. Health History form was  sent in US mail as she is unable to print from her e-mail. Nurse navigator role was introduced and letter was enclosed with further explanation. Zaina has writer's contact information for future correspondence. We plan to review supportive resources after she is at clinic for oncology consult.

## 2021-06-15 NOTE — PATIENT INSTRUCTIONS - HE
"  Preparing for Your Surgery  Getting started  A surgery nurse will call you to review your health history and instructions. They will give you an arrival time based on your scheduled surgery time.  Please be ready to share the following:    Your doctor's clinic name and phone number    Your medical, surgical and anesthesia history    A list of allergies and sensitivities    A list of medicines, including herbal treatments and over-the-counter drugs    Whether the patient has a legal guardian (ask how to send us the papers in advance)  If your child is having surgery, please ask for a copy of Preparing for Your Child's Surgery.    Preparing for surgery    Within 30 days of surgery: Have an exam at your family clinic (primary care clinic), or go to a pre-operative clinic. This exam is called a \"History and Physical,\" or H&P.    At your H&P exam, talk to your care team about all medicines you take. If you need to stop any medicines before surgery, ask when to start taking them again.  ? We do this for your safety. Many medicines can make you bleed too much during surgery. Some change how well surgery (anesthesia) drugs work.    Call your insurance company to see what it will and won't pay for. Ask if they need to pre-approve the surgery. (If no insurance, call [phone].)    Call your surgeon's clinic if there's any change in your health. This includes signs of a cold or flu (sore throat, runny nose, cough, rash, fever). It also includes a scrape or scratch near the surgery site.    If you have questions on the day of surgery, call your surgery center.  Eating and drinking guidelines  For your safety: Unless your surgeon tells you otherwise, follow the guidelines below.    Eat and drink as usual until 8 hours before surgery. After that, no food or milk.    Drink clear liquids until 2 hours before surgery. These are liquids you can see through, like water, Gatorade and Propel Water. You may also have black coffee " and tea (no cream or milk).    Nothing by mouth within 2 hours of surgery. This includes gum, candy and breath mints.    Stop alcohol the midnight before surgery.    If your family clinic tells you to take medicine on the morning of surgery, it's okay to take it with a sip of water.  Preventing infection    Shower or bathe the night before and morning of your surgery. Follow the instructions your clinic gave you. (If no instructions, use regular soap.)    Don't shave or clip hair near your surgery site. This can lead to skin infection.    Don't smoke the morning of surgery. Smoking increases the risk of infection. You may chew nicotine gum up to 2 hours before surgery. A nicotine patch is okay.  ? Note: Some surgeries require you to completely quit smoking and nicotine. Check with your surgeon.    Your care team will make every effort to keep you safe from infection. We will:  ? Clean our hands often with soap and water (or an alcohol-based hand rub).  ? Clean the skin at your surgery site with a special soap that kills germs. We'll also remove hair from the site as needed.  ? Wear special hair covers, masks, gowns and gloves during surgery.  ? Give antibiotic medicine, if prescribed. Not all surgeries need antibiotics.  What to bring on the day of surgery    Photo ID and insurance card    Copy of your health care directive, if you have one    Glasses and hearing aides (bring cases)  ? You can't wear contacts during surgery    Inhaler and eye drops, if you use them (tell us about these when you arrive)    CPAP machine or breathing device, if you use them    A few personal items, if spending the night    If you have . . .  ? A pacemaker or ICD (cardiac defibrillator): Bring the ID card.  ? An implanted stimulator: Bring the remote control.  ? A legal guardian: Bring a copy of the certified (court-stamped) guardianship papers.  Please remove any jewelry, including body piercings. Leave jewelry and other valuables at  home.  If you're going home the day of surgery  Important: If you don't follow the rules below, we must cancel your surgery.     Arrange for someone to drive you home after surgery. You may not drive, take a taxi or take public transportation by yourself (unless you'll have local anesthesia only).    Arrange for a responsible adult to stay with you overnight. If you don't, we may keep you in the hospital overnight, and you may need to pay the costs yourself.  Questions?   If you have any questions for your care team, list them here: _________________________________________________________________________________________________________________________________________________________________________________________________________________________________________________________________________________________________________________________  For informational purposes only. Not to replace the advice of your health care provider. Copyright   5982-1527 [employer]. All rights reserved. Clinically reviewed by Althea Banerjee MD. [employer] [id number] - REV 07/19.      Before Your Procedure or Hospital Admission  Testing for COVID-19 (Coronavirus)  Thank you for choosing [facility] for your health care needs. This is a very challenging time for everyone. The World Health Organization and the State of [place] have declared the COVID-19 virus a pandemic.   Our goal is to keep you and our team here at [facility] safe and healthy. We've taken several steps to make this happen. For example:    We screen our staff, care teams and patients for COVID-19.    Everyone at [facility] must wear a mask and stay 6 feet apart.    We are limiting hospital and clinic visitors.  Before you come in  All patients must get tested for COVID-19. Your test needs to happen 2 to 4 days before you check in to the hospital or surgery site.   A clinic scheduler will call about a week in advance to set up a testing time at  "one of our labs where we'll take a swab of your nose or throat.  Note: If you go to a clinic or pharmacy like [facility] or [facility] for your test, make sure it's a \"RT-PCR\" test, not a \"rapid\" COVID-19 test. (See Questions and Answers below.)  After the test, please stay at home and stay out of contact with other people. It will be harder for you to recover if you get COVID-19 before your treatment.  Please follow all current safety guidelines, including:    Limit trips outside your home.    Limit the number of people you see.    Always wear a mask outside your home.    Use social distancing. (Stay 6 feet away from others whenever you can.)    Wash your hands often.  If your test shows you have COVID-19  If your test is positive, we'll let you know. A positive test means that you have the virus.   We'll probably have to postpone your admission, surgery or procedure. Your doctor will discuss this with you. After that, we'll let you know what to do and when you can reschedule.   We may need to cancel your treatment on short notice for other reasons, too.  If your test shows you DON'T have COVID-19  Even if your test is negative, you may still get COVID-19. It's rare but, sometimes, the test result is wrong. You could also catch the virus after taking the test.   There's a very small chance that you could catch COVID-19 in the hospital or surgery center. [facility] has taken many steps to prevent this from happening.   Day of your surgery or procedure    Please come wearing a mask or something else that covers both your nose and mouth.    When you arrive, we'll ask you some questions to find out if you have any signs or symptoms of COVID-19.    Ask your care team if you can have visitors. All visitors must wear masks and will be screened for signs of COVID-19.  ? Even if no visitors are allowed, you can still have with you:    Your legal guardian or legal decision maker    A parent and one other visitor, if you are " "younger than 18 years old    A partner and a , if you are in labor  ? We might need to teach you about taking care of yourself after surgery. If so, a visitor can come into the hospital to learn about it, too.  ? The rules for visitors change often, depending on how much the virus is spreading. To learn more, see Visiting a Loved One in the Hospital during the COVID-19 Outbreak.  Please call your care team, hospital or surgery center if you have any questions. We thank you for your understanding and for choosing [facility] for your care.   Questions and Answers  Does it matter where I get tested for COVID-19?  Yes. We urge you to get tested at one of our [facility] COVID-19 testing sites. We process these tests in our lab and can get the results quickly. Your [facility] care team needs to get your results before you check in.  What should I do if I can't get tested at [facility]?  You can get tested somewhere else, but you'll need to take these extra steps:  1. Contact your family doctor or clinic to arrange your test.  2. Take the test within 4 days of your surgery or procedure. We can't accept tests older than 4 days.  3. Make sure your doctor or clinic faxes your results to [facility] at [phone].  If we don't get your results in time, we may have to postpone or cancel your treatment.  Ask if you're getting a \"RT-PCR\" COVID-19 test. It should NOT be a \"rapid\" COVID-19 test. Many drug stores use \"rapid\" tests, but they may not be as accurate. We don't accept the results of \"rapid\" tests.  For informational purposes only. Not to replace the advice of your health care provider. Copyright   2020 [facility] [employer]. All rights reserved. Clinically reviewed by Infection Prevention and the [facility] COVID-19 Clinical Team. [employer] [id number] - REV 10/20.    How to Take Your Medication Before Surgery  - STOP taking all vitamins and herbal supplements 14 days before " surgery.   - Take your Lisinopril with a small sip of water the morning of surgery.

## 2021-06-15 NOTE — PROGRESS NOTES
"History:  This is a 65 y.o. female who I'm asked to see (by Dr. Julio) for evaluation of a left breast cancer.  She presents with her friend, Chelsey.  This was picked up by screening mammogram as an area of microcalcifications.  This was in comparison to a mammogram performed in 2018.  The patient cannot feel a mass.  She denies any changes to her breast including nipple discharge or skin changes.  She has never had a needle biopsy prior to this.  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  Receptor status was deferred to surgical specimen.    Past medical history:  HTN  Morbid obesity    Past surgical history:  Sleeve gastrectomy  Cholecystectomy    Medications:     lisinopriL (PRINIVIL,ZESTRIL) 5 MG tablet, Take 1 tablet (5 mg total) by mouth daily., Disp: 90 tablet, Rfl: 0    Allergies:  Amoxicillin  Iodinated constrast    Social History:  Reports that she has never smoked. She has never used smokeless tobacco. She reports current alcohol use - social. She reports that she does not use drugs.    Family History:  She has a paternal aunt and maternal cousin with a history of breast cancer.  Her maternal grandfather had bone cancer and paternal uncle stomach cancer.    Review of systems:  General ROS: No complaints or constitutional symptoms  Skin: No complaints or symptoms   Hematologic/Lymphatic: No symptoms or complaints  Psychiatric: No symptoms or complaints  Endocrine: No excessive fatigue, no hypermetabolic symptoms reported  Respiratory ROS: No cough, shortness of breath, or wheezing  Cardiovascular ROS: No chest pain or dyspnea on exertion  Breast ROS: Ecchymosis from needle biopsy  Gastrointestinal ROS: No abdominal pain, nausea, diarrhea, or constipation  Musculoskeletal ROS: No recent injuries reported  Neurological ROS: No focal neurologic defects reported.      Physical exam:  Resp 16   Ht 5' 5\" (1.651 m)   Wt (!) 279 lb (126.6 kg)   Breastfeeding No   BMI 46.43 kg/m  "   General: Alert, cooperative, appears stated age   Skin: Skin color, texture, turgor normal, no rashes or lesions   Lymphatic: No obvious adenopathy, no swelling   Eyes: No scleral icterus, pupils equal  HENT: No traumatic injury to the head or face, no gross abnormalities  Lungs: Normal respiratory effort, breath sounds equal bilaterally  Heart: Regular rate and rhythm  Breasts: Large and pendulous.  Ecchymosis to left breast 3 o'clock position zone 2.  No palpable abnormality to either breast.  Abdomen: Soft, non-distended and non-tender to palpation  Neurologic: Grossly intact    Imaging:  Pertinent images personally reviewed by myself and discussed with the patient.  Radiology reports:  BILATERAL FULL FIELD DIGITAL SCREENING MAMMOGRAM     Performed on: 1/21/21      Compared to: 05/08/2018 Mammo Screening Bilateral and 12/07/2015 Mammo Screening Bilateral     FINDINGS: Bilateral screening mammogram was performed with the assistance of Computer-Aided Detection. The breasts are almost entirely fatty.      There are indeterminate calcifications in the left breast at the 2 o'clock position at anterior depth.     The remainder of the breast tissue is unremarkable.  No suspicious findings of the right breast.     IMPRESSION:  ACR BI-RADS Category 0: Need Additional Imaging Evaluation    Pathology:  BREAST, LEFT, 3 O'CLOCK, MICROCALCIFICATIONS, STEREOTACTIC-GUIDED NEEDLE CORE BIOPSY:      -  DUCTAL CARCINOMA IN SITU, HIGH NUCLEAR GRADE (III), COMEDO TYPE WITH COMEDONECROSIS AND MICROCALCIFICATIONS      -  NEGATIVE FOR INVASIVE CARCINOMA      -  ER/CA IMMUNOHISTOCHEMICAL STAINS ARE DEFERRED TO EXCISION     IMPRESSION:  Left breast ductal carcinoma in situ.    PLAN:   Discussed the surgical options for treatment of breast cancer which generally are a lumpectomy (partial mastectomy) combined with radiation versus a mastectomy.  Explained that the survival benefit is the same for both.  The difference is in local  recurrence risk.  The patient is a good candidate for a lumpectomy.  Since it is nonpalpable, it would require preoperative wire localization.  Discussed SLN biopsy.  The procedure and rationale were explained.   Discussed that at this point it would be very unlikely for her to need chemotherapy since it does not play a role in DCIS treatment.  If the tumor is estrogen receptor positive, she would be a candidate for endocrine therapy.    After our discussion, all questions were answered to satisfaction.  She is interested in pursuing breast preservation therapy.  Therefore, we will plan to schedule a left wire localized lumpectomy. This is typically an outpatient procedure under local MAC anesthetic. Although with her morbid obesity, I would plan for LMA general.  The risks and benefits of surgery were explained.  Also talked about expected recovery time.  She would then follow-up with myself about 1 week after surgery to review final pathology and next steps.    Joslyn Marie DO  General Surgeon  [facility]  Breast [address] [address]  Office: [phone]  Employed by - [facility]

## 2021-06-15 NOTE — TELEPHONE ENCOUNTER
Patient called, has disability forms to complete for her work, hoping to be off 2-26-21 through 5-3-21 as she is unsure how she will get through radiation.  Told her that Dr. Marie can only write for what she needs off after her surgery, which is typically a week following a lumpectomy.  Told her that since her post op appointment is 3-15-21, Dr. Marie can write for her to be out through that date then the radiation oncologist will need to take over completing those dates.    She agrees.  Told her I will complete the forms and fax to the appropriate personnel. Support provided, invited calls.

## 2021-06-15 NOTE — ANESTHESIA CARE TRANSFER NOTE
Last vitals:   Vitals:    02/26/21 1104   BP: 93/55   Pulse: 93   Resp: 16   Temp: 36.2  C (97.2  F)   SpO2: 97%     Pt brought to PACU on 10L facemask. Monitors applied. VSS upon arrival.    Patient's level of consciousness is drowsy  Spontaneous respirations: yes  Maintains airway independently: yes  Dentition unchanged: yes  Oropharynx: oropharynx clear of all foreign objects    QCDR Measures:  ASA# 20 - Surgical Safety Checklist: WHO surgical safety checklist completed prior to induction    PQRS# 430 - Adult PONV Prevention: 4558F - Pt received => 2 anti-emetic agents (different classes) preop & intraop  ASA# 8 - Peds PONV Prevention: NA - Not pediatric patient, not GA or 2 or more risk factors NOT present  PQRS# 424 - Jaqui-op Temp Management: 4559F - At least one body temp DOCUMENTED => 35.5C or 95.9F within required timeframe  PQRS# 426 - PACU Transfer Protocol: - Transfer of care checklist used  ASA# 14 - Acute Post-op Pain: ASA14B - Patient did NOT experience pain >= 7 out of 10

## 2021-06-15 NOTE — PROGRESS NOTES
[facility]  [address] [facility] [address]  Dept: [phone]  Dept Fax: [phone]  Primary Provider: Hortencia Banuelos NP  Pre-op Performing Provider: HORTENCIA BANUELOS    PREOPERATIVE EVALUATION:  Today's date: 2/18/2021    Zaina Cortez is a 65 y.o. female who presents for a preoperative evaluation.    Surgical Information:  Surgery/Procedure: Left Lumpectomy  Surgery Location: [facility]  Surgeon: Dr. Marie  Surgery Date: 2/26/21  Time of Surgery: 7:15 am  Where patient plans to recover: At home with family  Fax number for surgical facility: Note does not need to be faxed, will be available electronically in Epic.    Type of Anesthesia Anticipated: General    Assessment & Plan      The proposed surgical procedure is considered LOW risk.    Preop general physical exam  EKG reads NSR with RBBB, which is consistent with prior EKG.  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  Recommend taking with a small sip of water the morning of surgery.    RBBB    Morbid obesity (H)         Risks and Recommendations:  The patient has the following additional risks and recommendations for perioperative complications:   - Morbid obesity (BMI >40)    Medication Instructions:   - ACE/ARB: May be continued on the day of surgery.     RECOMMENDATION:  APPROVAL GIVEN to proceed with proposed procedure, without further diagnostic evaluation.      Subjective     HPI related to upcoming procedure:     Patient is scheduled for a left lumpectomy.  She had an abnormal screening mammogram.  She does not palpate a lump.  No family history of breast cancer.    Patient is on lisinopril 5 mg for hypertension.  She has been tolerating this well.    She was diagnosed with MARCO prior to her sleeve gastrectomy  in 2013.  She did use a CPAP at one point, but no longer needed it after surgery.  Patient denies any snoring, frequent nighttime wakening, or daytime fatigue.    Preop Questions 2/18/2021   Have you ever had a heart attack or stroke? No   Have you ever had surgery on your heart or blood vessels, such as a stent placement, a coronary artery bypass, or surgery on an artery in your head, neck, heart, or legs? No   Do you have chest pain with activity? No   Do you have a history of  heart failure? No   Do you currently have a cold, bronchitis or symptoms of other infection? No   Do you have a cough, shortness of breath, or wheezing? No   Do you or anyone in your family have previous history of blood clots? No   Do you or does anyone in your family have a serious bleeding problem such as prolonged bleeding following surgeries or cuts? No   Have you ever had problems with anemia or been told to take iron pills? No   Have you had any abnormal blood loss such as black, tarry or bloody stools, or abnormal vaginal bleeding? No   Have you ever had a blood transfusion? YES - after cholecystectomy   Have you ever had a transfusion reaction? No   Are you willing to have a blood transfusion if it is medically needed before, during, or after your surgery? Yes   Have you or any of your relatives ever had problems with anesthesia? No   Do you have sleep apnea, excessive snoring or daytime drowsiness? Yes, prior to gastric sleeve.  On CPAP in 2013.     Do you have any artifical heart valves or other implanted medical devices like a pacemaker, defibrillator, or continuous glucose monitor? No   Do you have artificial joints? No   Are you allergic to latex? No     Health Care Directive:  Patient does not have a Health Care Directive or Living Will: Discussed advance care planning with patient; however, patient declined at this time.    Preoperative Review of :    reviewed - no record of controlled substances prescribed.    Review  of Systems  Constitutional, neuro, ENT, endocrine, pulmonary, cardiac, gastrointestinal, genitourinary, musculoskeletal, integument and psychiatric systems are negative, except as otherwise noted.      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 Maxwell     MS REMV STOMACH,PART,DISTAL,GASTRODUOD      Description: Partial Gastrectomy;  Recorded: 12/23/2013;  Comments: Sleeve Gastrectomy for Bariatric Purposes.     Current Outpatient Medications   Medication Sig Dispense Refill     lisinopriL (PRINIVIL,ZESTRIL) 5 MG tablet Take 1 tablet (5 mg total) by mouth daily. 90 tablet 0     No current facility-administered medications for this visit.        Allergies   Allergen Reactions     Amoxicillin Hives     Iodinated Contrast Media Hives       Social History     Tobacco Use     Smoking status: Never Smoker     Smokeless tobacco: Never Used   Substance Use Topics     Alcohol use: Yes     Alcohol/week: 0.0 standard drinks      Family History   Problem Relation Age of Onset     Heart disease Mother      Kidney disease Mother      Cancer Maternal Grandfather      Dementia Paternal Grandfather      Congenital heart disease Brother      Breast cancer Paternal Aunt         Approx age in 60's     Social History     Substance and Sexual Activity   Drug Use No        Objective     /68  "(Patient Site: Right Arm, Patient Position: Sitting, Cuff Size: Adult Large)   Pulse 76   Ht 5' 5\" (1.651 m)   Wt (!) 279 lb 8 oz (126.8 kg)   BMI 46.51 kg/m    Physical Exam    GENERAL APPEARANCE: healthy, alert and no distress     EYES: EOMI, PERRL     HENT: ear canals and TM's normal and nose and mouth without ulcers or lesions     NECK: no adenopathy, no asymmetry, masses, or scars and thyroid normal to palpation     RESP: lungs clear to auscultation - no rales, rhonchi or wheezes     CV: regular rates and rhythm, normal S1 S2, no S3 or S4 and no murmur, click or rub     ABDOMEN:  soft, nontender, no HSM or masses and bowel sounds normal     MS: extremities normal- no gross deformities noted, no evidence of inflammation in joints, FROM in all extremities.     SKIN: no suspicious lesions or rashes     NEURO: Normal strength and tone, sensory exam grossly normal, mentation intact and speech normal     PSYCH: mentation appears normal. and affect normal/bright     LYMPHATICS: No cervical adenopathy    Recent Labs   Lab Test 02/18/21  1139   HGB 14.2         K 4.9   CREATININE 1.02        PRE-OP Diagnostics:   Recent Results (from the past 24 hour(s))   Electrocardiogram Perform and Read    Collection Time: 02/18/21 11:11 AM   Result Value Ref Range    SYSTOLIC BLOOD PRESSURE      DIASTOLIC BLOOD PRESSURE      VENTRICULAR RATE 79 BPM    ATRIAL RATE 79 BPM    P-R INTERVAL 134 ms    QRS DURATION 130 ms    Q-T INTERVAL 390 ms    QTC CALCULATION (BEZET) 447 ms    P Axis 37 degrees    R AXIS 5 degrees    T AXIS 11 degrees    MUSE DIAGNOSIS       Normal sinus rhythm  Right bundle branch block  Minimal voltage criteria for LVH, may be normal variant  Abnormal ECG  When compared with ECG of 05-JUN-2013 13:55,  No significant change was found  Confirmed by ARASH ESPINOZA MD LOC:[facility] ([id number]) on 2/18/2021 2:41:26 PM     HM2(CBC w/o Differential)    Collection Time: 02/18/21 11:39 AM   Result Value Ref Range    WBC " 5.7 4.0 - 11.0 thou/uL    RBC 4.77 3.80 - 5.40 mill/uL    Hemoglobin 14.2 12.0 - 16.0 g/dL    Hematocrit 44.0 35.0 - 47.0 %    MCV 92 80 - 100 fL    MCH 29.8 27.0 - 34.0 pg    MCHC 32.3 32.0 - 36.0 g/dL    RDW 12.7 11.0 - 14.5 %    Platelets 297 140 - 440 thou/uL    MPV 10.5 (H) 7.0 - 10.0 fL   Basic Metabolic Panel    Collection Time: 02/18/21 11:39 AM   Result Value Ref Range    Sodium 139 136 - 145 mmol/L    Potassium 4.9 3.5 - 5.0 mmol/L    Chloride 105 98 - 107 mmol/L    CO2 26 22 - 31 mmol/L    Anion Gap, Calculation 8 5 - 18 mmol/L    Glucose 84 70 - 125 mg/dL    Calcium 8.8 8.5 - 10.5 mg/dL    BUN 16 8 - 22 mg/dL    Creatinine 1.02 0.60 - 1.10 mg/dL    GFR MDRD Af Amer >60 >60 mL/min/1.73m2    GFR MDRD Non Af Amer 55 (L) >60 mL/min/1.73m2     EKG: appears normal, NSR, Right Bundle Branch Block    REVISED CARDIAC RISK INDEX (RCRI)   The patient has the following serious cardiovascular risks for perioperative complications:   - No serious cardiac risks = 0 points    RCRI INTERPRETATION: 0 points: Class I (very low risk - 0.4% complication rate)       Signed Electronically by: Hortencia Banuelos NP    Copy of this evaluation report is provided to requesting physician.

## 2021-06-15 NOTE — TELEPHONE ENCOUNTER
Refill Approved    Rx renewed per Medication Renewal Policy. Medication was last renewed on 12/2/20.    Ortiz Tobar, Care Connection Triage/Med Refill 2/25/2021     Requested Prescriptions   Pending Prescriptions Disp Refills     lisinopriL (PRINIVIL,ZESTRIL) 5 MG tablet [Pharmacy Med Name: LISINOPRIL 5 MG TABLET] 90 tablet 0     Sig: TAKE 1 TABLET BY MOUTH EVERY DAY       Ace Inhibitors Refill Protocol Passed - 2/25/2021 12:22 AM        Passed - PCP or prescribing provider visit in past 12 months       Last office visit with prescriber/PCP: 11/11/2020 Shannan Oneill CNP OR same dept: 11/11/2020 Shannan Oneill CNP OR same specialty: 11/11/2020 Shannan Oneill CNP  Last physical: Visit date not found Last MTM visit: Visit date not found   Next visit within 3 mo: Visit date not found  Next physical within 3 mo: Visit date not found  Prescriber OR PCP: Shannan Oneill CNP  Last diagnosis associated with med order: 1. Essential hypertension  - lisinopriL (PRINIVIL,ZESTRIL) 5 MG tablet [Pharmacy Med Name: LISINOPRIL 5 MG TABLET]; TAKE 1 TABLET BY MOUTH EVERY DAY  Dispense: 90 tablet; Refill: 0    If protocol passes may refill for 12 months if within 3 months of last provider visit (or a total of 15 months).             Passed - Serum Potassium in past 12 months     Lab Results   Component Value Date    Potassium 4.9 02/18/2021             Passed - Blood pressure filed in past 12 months     BP Readings from Last 1 Encounters:   02/18/21 132/68             Passed - Serum Creatinine in past 12 months     Creatinine   Date Value Ref Range Status   02/18/2021 1.02 0.60 - 1.10 mg/dL Final

## 2021-06-15 NOTE — PROGRESS NOTES
Zaina presents to [facility] for a post op appointment with Dr. Marie .  She is accompanied by herself for appointment.  She states she is doing well, minimal pain.  She has good ROM, reviewed ROM exercises with her.  Patient met with Dr. Marie .  See dictation for details of visit.  She will plan to be referred onto Medical Oncology and Radiation Oncology and will plan to follow up with Dr. Marie  in one year with bilateral mammograms.  Invited calls sooner if she has any questions.  RN time 15 mins

## 2021-06-16 PROBLEM — D05.12 NEOPLASM OF LEFT BREAST, PRIMARY TUMOR STAGING CATEGORY TIS: DUCTAL CARCINOMA IN SITU (DCIS): Status: ACTIVE | Noted: 2021-02-09

## 2021-06-16 PROBLEM — E66.01 MORBID OBESITY (H): Status: ACTIVE | Noted: 2020-10-28

## 2021-06-16 NOTE — PROCEDURES
Procedures    SIMULATION NOTE:       DIAGNOSIS: Left breast DCIS, ER/NE positive, status post lumpectomy with negative margin and closest margin measured 0.2 mm posterior.     INDICATION:  Postoperative radiation therapy is recommended for patient as second part of the breast preservation protocol to further reduce the likelihood of cancer recurrence.    CONSENT:  The possible risks and the side effects of radiation therapy have been discussed with patient in detail and at great length.  Questions are answered to patient's satisfaction.  Written consent was obtained.    SIMULATION:  The patient is in a supine position with a wing breast board to help keep the same position during the daily radiation therapy.  Tentative isocenter is set up in the center of the thoracic region.  We will acquire CT information to help us to better locate target and design radiation therapy field.    We are also going to obtain 4-D CT and use respiratory gating (or breath holdidng) technique to help us to reduce the radiation dose to the heart and lung.      BLOCKS:  Custom blocks will be drawn to minimize radiation to normal tissues and to protect normal organs including, but not limited to, lungs, heart, liver, bone and soft tissue.    DOSAGE:  I plan to give her radiation therapy at 265 cGy each fraction to a total of 4240 cGy in 16 treatments targeted to the left breast only.  An additional 1000 cGy in 4 fractions will be given to the primary tumor bed using an electron. I will consider to use 3D conformer technique to help us better locate target and to protect normal tissue.    The patient indeed has a large breast tissue which may compromise the dosimetry planning with hypofractionation protocol.  We may consider to use IMRT or standard fractionation if the 3D hypofractionation protocol will not be able to provide a good dosimetry.      Taylor Purcell MD, PhD  Department of Radiation Oncology   [facility]  Tel:  [phone]  Page: [phone]    [facility]  [address] [place] [address]     [place] [id number] [facility]   [place] [address]

## 2021-06-16 NOTE — PATIENT INSTRUCTIONS - HE
Calcium 2607-7606 mg daily in 2 divided doses.  Vitamin D 9194-5272 units daily.     [email or web address]. [email or web address].org

## 2021-06-16 NOTE — PROGRESS NOTES
Patient here ambulatory for radiation consult for her breast cancer.  Patient states she has healed well from her surgery.  Met with Dr. Alvarez in medical oncology today and will be having hormone therapy.  15 minutes spent in review of radiation process and potential side effects.  Written information given for review: ACS RT handout, RT to breast handout, Heena RT handout, doctor bio card, Riley's map, insurance PA handout and welcome letter.  Seen by Dr. Purcell.  Plan RTC for follow up and/or CT simulation as directed by physician.

## 2021-06-16 NOTE — PROGRESS NOTES
[facility] was initiated on patient's behalf via the portal. Per patient's preference, the entire stipend was requested in gas cards, if jason is approved.  Will contact patient to relay update when jason is approved.
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
## 2021-06-16 NOTE — TELEPHONE ENCOUNTER
Followed up with Zaina today to check in. She has her first radiation treatment today and it went well. She was approved for [facility] lopez and she has received the gas cards in the mail to help alleviate transportation expenses related to her treatment..She expressed appreciation for helping to facilitate the lopez.. She was pleased to find out that she will only need 20 fractions of raditaion treatment. She spoke with her representative at [employer] (short term disability) and she needs to provide additional notes for her short term disability. It was her understanding that they did not need a new physician form completed as they will simply extend but just need additional notes for her continued care with radiation. She will direct her request to the radiation nurses and provide them with the fax number at her next clinic visit. Writer will send a message to radiation nurse to make them aware of her request. She has a consult arranged with Emilia Mcarthur later this week for additional layer of support. She has writer's contact information for future reference and calls invited with any questions or support needs. Will follow up in the future.

## 2021-06-16 NOTE — PROGRESS NOTES
RADIATION ONCOLOGY WEEKLY TREATMENT VISIT NOTE      Assessment / Impression       1. 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  All questions and concerns addressed.    Plan:     Continue radiation treatment as prescribed.    Subjective:      HPI: Zaina Cortez is a 65 y.o. female with    1. 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    Assessment                  Body Site: Breast                           Site: Left breast  Stereotactic Radiosurgery: No  Today's Dose: 265  Total Dose for Breast: 5240  Today's Fraction/Total Fraction Breast:   Drainage: 0: Absent                                            Sexuality Alteration                 Emotional Alteration Copin: Effective  Comfort Alteration KPS: 90% Can perform normal activity, minor signs of disease  Fatigue (ONS scale) : 2: Mild Fatigue  Pain Location: denies  Hot Flashes and/or Flushes: 1: Mild or no more than 1 per day   Nutrition Alteration Anorexia: 0: None  Nausea: 0: None  Vomitin: None  Skin Alteration Skin Sensation: 0: No problem  Skin Reaction: 0: None(Radioplex given with instruction)  AUA Assessment                                  Accompanied by       Objective:     Exam: Examination reviewed no significant changes.    Vitals:    21 1157   Weight: (!) 278 lb 11.2 oz (126.4 kg)       Wt Readings from Last 8 Encounters:   21 (!) 278 lb 11.2 oz (126.4 kg)   21 (!) 280 lb (127 kg)   21 (!) 278 lb 3.2 oz (126.2 kg)   21 (!) 279 lb (126.6 kg)   21 (!) 279 lb 8 oz (126.8 kg)   21 (!) 279 lb (126.6 kg)   20 (!)  276 lb (125.2 kg)   10/28/20 (!) 275 lb (124.7 kg)       General: Alert and oriented, in no acute distress  Zaina has no Erythema.  Aria chart and setup information reviewed    Taylor Purcell MD

## 2021-06-16 NOTE — PROGRESS NOTES
[facility] Hematology and Oncology Consult Note    Patient: Zaina Cortez  MRN: [id number]  Date of Service: 03/24/2021        Reason for Visit    I was consulted by Dr. Marie regarding left breast DCIS    Assessment/Plan    #.  DCIS of the upper outer quadrant of the left breast, ER positive (greater than 95%), VA focal positive (5%)  #.  Postmenopausal     Reviewed the clinical course, pathology results in detail with the patient.  I informed her that she has stage 0 left breast cancer.  The treatment goal is for cure.  Discussed about adjuvant treatment to decrease breast cancer recurrence.  She does not need chemotherapy.  We discussed about adjuvant radiation as part of breast conserving surgery.  She is going to meet with Dr. Purcell from radiation oncology this afternoon.  I discussed about adjuvant endocrine therapy with options of tamoxifen or aromatase inhibitors.  I discussed about medication side effects.  Recommended duration of endocrine therapy is 5 years.  She is willing to start adjuvant endocrine therapy.  I recommended her to start anytime.   As she never had bone density scan, I requested baseline bone density scan to complete prior to next visit.  I also advised her to monitor cholesterol profile once a year.   Discussed about surveillance clinical exam every 6 months for 5 years, yearly thereafter.  She will need annual screening mammogram.   Follow-up with me in about 3 months.    #.  Bone health.   Recommended to start calcium and vitamin D 1 tablet twice daily.  Information on daily requirement dose given.     To obtain baseline bone density scan.  Discussed about the need of bone density monitoring during the endocrine therapy.    #.  Overweight/obesity   Discussed about the importance of weight loss and breast cancer survivors.    #.  Distress   She was very nervous and tearful coming to the cancer clinic because this reminded her past experience in cancer care clinic setting when her   went through head and neck cancer treatment.  Her blood pressure was quite elevated upon arrival.  Blood pressure improved at the end of our visit.  I offered psychotherapy referral and she is willing to meet with our psychotherapist.    ECOG Performance   ECOG Performance Status: 0  Distress Assessment  Distress Assessment Score: 5(visit today, brings back memories of  who had cancer)        Problem List    1. Menopause  DXA Bone Density Scan   2. 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 Zaina Cortez is a very pleasant 65-year-old female was found left breast DCIS by screening mammogram.  She underwent left breast lumpectomy on 2/26/2021.  She is recovering very well.  She is here to discuss about adjuvant treatment.    No prior history of abnormal mammogram or breast surgery.    She is post cholecystectomy in 2008, sleeve gastrectomy in June 2013.      Menarche at age 15.  LMP 11/2007.  G2, P0.  Patient's first pregnancy was 25.    She is .  She lost her  from her neck cancer in 2016.  She does not have children.  She is a .  Never smoker.  She drinks alcohol about 2 a week.    No family history of cancer.    Past History  Past Medical History:   Diagnosis Date     Hypertension      Sleep apnea     Created by Conversion  Replacement Utility updated for latest IMO load, Before Gastrectomy Used a CPAP no  long uses     Past Surgical History:   Procedure Laterality Date     MA STEREOTACTIC BREAST BIOPSY VACUUM L Left 2/1/2021     IA MASTECTOMY, PARTIAL Left 2/26/2021    Procedure: LEFT WIRE LOCALIZED LUMPECTOMY;  Surgeon: Joslyn Marie DO;  Location: [facility] OR;  Service: General     IA REMOVAL GALLBLADDER      Description: Cholecystectomy;  Recorded: 06/01/2012;  Comments: 04/2008 PAULINA Maxwell     IA REMV STOMACH,PART,DISTAL,GASTRODUOD      Description: Partial Gastrectomy;  Recorded: 12/23/2013;  Comments: Sleeve Gastrectomy for Bariatric Purposes.     Family History   Problem Relation Age of Onset     Heart disease Mother      Kidney disease Mother      Cancer Maternal Grandfather      Dementia Paternal Grandfather      Congenital heart disease Brother      Breast cancer Paternal Aunt         Approx age in 60's     Social History     Socioeconomic History     Marital status:      Spouse name: Troy     Number of children: 0     Years of education: None     Highest education level: None   Occupational History     Occupation:    Social Needs     Financial resource strain: None     Food insecurity     Worry: None     Inability: None     Transportation needs     Medical: None     Non-medical: None   Tobacco Use     Smoking status: Never Smoker     Smokeless tobacco: Never Used   Substance and Sexual Activity     Alcohol use: Yes     Alcohol/week: 0.0 standard drinks     Comment: 2 drinks per week     Drug use: No     Sexual activity: Not Currently     Partners: Male     Birth control/protection: Post-menopausal   Lifestyle     Physical activity     Days per week: None     Minutes per session: None     Stress: None   Relationships     Social connections     Talks on phone: None     Gets together: None     Attends Worship service: None     Active member of club or organization: None     Attends meetings of clubs or organizations: None     Relationship status: None     Intimate partner violence      Fear of current or ex partner: None     Emotionally abused: None     Physically abused: None     Forced sexual activity: None   Other Topics Concern     None   Social History Narrative     None     Allergies    Allergies   Allergen Reactions     Amoxicillin Hives     Iodinated Contrast Media Hives       Review of Systems    General  General (WDL): All general elements are within defined limits  ENT  ENT (WDL): Exceptions to WDL  Glasses or Contacts: Yes - Chronic (Greater than 3 months)  Respiratory  Respiratory (WDL): All respiratory elements are within defined limits  Cardiovascular  Cardiovascular (WDL): Exceptions to WDL  Irregular Heart Beat: Yes - Chronic (Greater than 3 months)(bundle branch block)  Endocrine  Endocrine (WDL): All endocrine elements are within defined limits  Gastrointestinal  Gastrointestinal (WDL): All gastrointestinal elements are within defined limits  Musculoskeletal  Musculoskeletal (WDL): All musculoskeletal elements are within defined limits  Neurological  Neurological (WDL): All neurological elements are within defined limits  Dominant Hand: Right  Psychological/Emotional  Psychological/Emotional (WDL): All psychological/emotional elements are within defined limits  Hematological/Lymphatic  Hematological/Lymphatic (WDL): All hematological/lymphatic elements are within defined limits  Dermatological  Dermatologic (WDL): All dermatological elements are within defined limits  Genitourinary/Reproductive  Genitourinary/Reproductive (WDL): All genitourinary/reproductive elements are within defined limits  Reproductive (Females only)     Pain  Currently in Pain: No/denies    Physical Exam    Recent Vitals 3/24/2021   Height -   Weight 280 lbs   BSA (m2) 2.41 m2   /96   Pulse 75   Temp 98.2   Temp src 1   SpO2 97   Some recent data might be hidden       GENERAL: Alert and oriented to time place and person. Seated comfortably. In no distress.    HEAD: Atraumatic and  normocephalic.    EYES: IRA, EOMI.  No pallor.  No icterus.    Oral cavity: no mucosal lesion or tonsillar enlargement.    NECK: supple. JVP normal.  No thyroid enlargement.    LYMPH NODES: No palpable, cervical, axillary or inguinal lymphadenopathy.    CHEST: clear to auscultation bilaterally.  Resonant to percussion throughout bilaterally.  Symmetrical breath movements bilaterally.    BREASTS: Pendulous breasts.  Well-healed postlumpectomy scar in the left breast.  No discrete palpable masses.  No next month no dictation    CVS: S1 and S2 are heard. Regular rate and rhythm.  No murmur or gallop or rub heard.  No peripheral edema.    ABDOMEN: Soft. Not tender. Not distended.  No palpable hepatomegaly or splenomegaly.  No other mass palpable.  Bowel sounds heard.    EXTREMITIES: Warm.    SKIN: no rash, or bruising or purpura.  Has a full head of hair.      Lab Results    No results found for this or any previous visit (from the past 168 hour(s)).    Imaging Results    Mammo Post Clip Placement Left    Result Date: 2/26/2021  INDICATION: Pre-operative localization of DCIS at 2 o'clock, 8 cm from the nipple. PROCEDURE: Informed consent was obtained from the patient. The breast was cleansed with ChloraPrep. Lidocaine was used for local anesthesia. A -gauge needle was then advanced to the area of abnormality. A localization wire was then deployed. Post-procedure mammograms demonstrate the localization wire in appropriate position. The previously placed marker was visualized. The patient tolerated this well.     Sonographically guided left wire localization. A specimen was sent for radiography. Specimen radiograph demonstrates the area of abnormality and the localization wire to be included in the specimen.    Mammo Breast Specimen Left    Result Date: 2/26/2021  INDICATION: Pre-operative localization of DCIS at 2 o'clock, 8 cm from the nipple. PROCEDURE: Informed consent was obtained from the patient. The breast was  cleansed with ChloraPrep. Lidocaine was used for local anesthesia. A -gauge needle was then advanced to the area of abnormality. A localization wire was then deployed. Post-procedure mammograms demonstrate the localization wire in appropriate position. The previously placed marker was visualized. The patient tolerated this well.     Sonographically guided left wire localization. A specimen was sent for radiography. Specimen radiograph demonstrates the area of abnormality and the localization wire to be included in the specimen.    Image Guided Breast Localization Left    Result Date: 2/26/2021  INDICATION: Pre-operative localization of DCIS at 2 o'clock, 8 cm from the nipple. PROCEDURE: Informed consent was obtained from the patient. The breast was cleansed with ChloraPrep. Lidocaine was used for local anesthesia. A -gauge needle was then advanced to the area of abnormality. A localization wire was then deployed. Post-procedure mammograms demonstrate the localization wire in appropriate position. The previously placed marker was visualized. The patient tolerated this well.     Sonographically guided left wire localization. A specimen was sent for radiography. Specimen radiograph demonstrates the area of abnormality and the localization wire to be included in the specimen.        Signed by: Yasmany Alvarez MD

## 2021-06-16 NOTE — PROCEDURES
Procedures  Clinical Treatment Planning Note    The complex radiotherapy planning will be completed for the patient to plan the treatment for her breast cancer.  The patient had a planning CT earlier today for planning.  The treatment aids were used for planning, including headrest and Wing Board to help keep the same position during the daily radiation therapy.  The therapy planning is necessary to reduce radiotherapy dose to the normal critical organs which are not possible with simple treatment.  In addition, dose to the target and the critical structures requires three-dimensional analysis of the isodose distribution.  The planning will be done to reduce dose to the lungs, spinal cord, liver, and heart.     I will contour the clinical tumor volume  CTV , with expanded volume of planning treatment volume  PTV  on the treatment planning system.  The critical structures will be outlined, including spinal cord, lungs, heart, liver, bone and soft tissue.     Treatment planning will be done on the computer treatment planning system.  The tangential field will be used to achieve optimal coverage of the target volume.  Dose distribution to the above critical structures will be reviewed.  Isodose distribution along with the X, Y, Z plan will also be reviewed.  Custom blocking will be used to shield normal structures.  The beam s eye views will be reviewed and the digital reconstructed image will be reviewed on the planning software.      The patient will receive 4240 cGy in 16 treatments targeted to the left chest/breast region using 6-MV or 15-MV photons.  An additional 1000 cGy in 4 fractions will be planned to give to the primary tumor bed using electron.        Taylor Purcell MD, PhD  Department of Radiation Oncology   [facility]  Tel: [phone]  Page: [phone]    [facility]  [address] [place] [address]     [place] [id number] [facility] [place] [address]

## 2021-06-16 NOTE — PROGRESS NOTES
RADIATION ONCOLOGY WEEKLY TREATMENT VISIT NOTE      Assessment / Impression       1. 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  All questions and concerns addressed.    Plan:     Continue radiation treatment as prescribed.    Discussed with the patient about proper skin care.    Subjective:      HPI: Zaina Cortez is a 65 y.o. female with    1. 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    Assessment                  Body Site: Breast                           Site: Left Breast  Stereotactic Radiosurgery: No  Today's Dose: 1590  Total Dose for Breast: 5240  Today's Fraction/Total Fraction Breast:   Drainage: 0: Absent                                            Sexuality Alteration                 Emotional Alteration Copin: Effective  Comfort Alteration KPS: 90% Can perform normal activity, minor signs of disease  Fatigue (ONS scale) : 4: Moderate Fatigue  Pain Location: denies  Hot Flashes and/or Flushes: 1: Mild or no more than 1 per day   Nutrition Alteration Anorexia: 0: None  Nausea: 0: None  Vomitin: None  Skin Alteration Skin Sensation: 0: No problem  Skin Reaction: 0: None  AUA Assessment                                  Accompanied by       Objective:     Exam: mild Erythema.    Vitals:    21 1023   BP: 140/84   Pulse: 94   Temp: 97.9  F (36.6  C)   TempSrc: Oral   SpO2: 96%   Weight: (!) 280 lb (127 kg)       Wt Readings from Last 8 Encounters:   21 (!) 280 lb (127 kg)   21 (!) 278 lb 11.2 oz (126.4 kg)   21 (!) 280 lb (127 kg)   21 (!) 278 lb 3.2 oz (126.2 kg)   21 (!) 279 lb  (126.6 kg)   02/18/21 (!) 279 lb 8 oz (126.8 kg)   02/08/21 (!) 279 lb (126.6 kg)   11/11/20 (!) 276 lb (125.2 kg)       General: Alert and oriented, in no acute distress  Zaina has mild Erythema.  Aria chart and setup information reviewed    Taylor Purcell MD

## 2021-06-16 NOTE — PROGRESS NOTES
RADIATION ONCOLOGY WEEKLY TREATMENT VISIT NOTE      Assessment / Impression       1. 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  All questions and concerns addressed.    Plan:     Continue radiation treatment as prescribed.    Discussed with patient about skin care.    Subjective:      HPI: Zaina Cortez is a 65 y.o. female with    1. 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    Assessment                  Body Site: Breast                           Site: Left Breast  Stereotactic Radiosurgery: No  Concurrent Therapy: Yes(anastrozole)  Today's Dose: 2915  Total Dose for Breast: 5240  Today's Fraction/Total Fraction Breast:   Drainage: 0: Absent                                            Sexuality Alteration                 Emotional Alteration Copin: Effective  Comfort Alteration KPS: 90% Can perform normal activity, minor signs of disease  Fatigue (ONS scale) : 4: Moderate Fatigue  Pain Location: denies  Hot Flashes and/or Flushes: 1: Mild or no more than 1 per day   Nutrition Alteration Anorexia: 0: None  Nausea: 0: None  Vomitin: None  Skin Alteration Skin Sensation: 0: No problem  Skin Reaction: 1: Faint erythema or dry desquamation  AUA Assessment                                  Accompanied by       Objective:     Exam:  mild Erythema.    Vitals:    21 1032   BP: (!) 159/104   Pulse: 100   Temp: 97.6  F (36.4  C)   TempSrc: Oral   SpO2: 98%   Weight: (!) 280 lb 8 oz (127.2 kg)       Wt Readings from Last 8 Encounters:   21 (!) 280 lb 8 oz (127.2 kg)   21 (!) 280 lb (127 kg)   21 (!) 278 lb  11.2 oz (126.4 kg)   03/24/21 (!) 280 lb (127 kg)   03/24/21 (!) 278 lb 3.2 oz (126.2 kg)   02/26/21 (!) 279 lb (126.6 kg)   02/18/21 (!) 279 lb 8 oz (126.8 kg)   02/08/21 (!) 279 lb (126.6 kg)       General: Alert and oriented, in no acute distress  Zaina has mild Erythema.  Aria chart and setup information reviewed    Taylor Purcell MD

## 2021-06-16 NOTE — TELEPHONE ENCOUNTER
Message from ANA James, Radiation RN when patient should start anastrozole. I told her I would clarify with Dr. Alvarez and call her back.      Dr. Alvarez said to start anastrozole when she gets it. Alcira updated.  I called patient. Patient verbalized understanding and was at pharmacy picking up mediation now/CARL Koo RN

## 2021-06-16 NOTE — TELEPHONE ENCOUNTER
Called Zaina to check in after her medical and radiation oncology consults yesterday. She relayed that she felt a good connection to both of her providers. She picked up the anastrozole from the pharmacy and starting taking today. She plans to proceed with radiation treatment and was impressed to be scheduled for her CT  Sim so quickly, early this afternoon. She is single and is on short term disability from her work due to surgery/treatment resulting in decreased income. Discussed financial grants as a way to offset treatment related expenses. Screened for eligibility for income based grants and decreased income is above the guidelines but non income based lopez will be pursued. She would like gas card for the entire stipend and writer will request that these be sent to her. We also discussed other resources for emotional support and she is scheduled for a consult with Emilia Mcarthur, Therapist at . She is also aware that support groups and peer support are options for support and will let writer know if she would like to access in the future. Will follow up and relay when lopez has been approved. She has writer's contact information for future reference and calls invited with support needs or questions. Will follow up in the future.

## 2021-06-16 NOTE — TELEPHONE ENCOUNTER
Records in Epic Chart Review / Radiology images in Nil.    Notes  3/15/2021 - Progress Notes / Post op follow up, Dr. Marie.  2/26/2021 - Op Note / Left Breast Lumpectomy, Frank Rogel.  2/18/2021 - Progress Notes / Pre Op Physical, Hortencia Banuelos NP.  2/8/2021 - Progress Notes / Surgery Consult, Dr. Marie.    Labs  2/26/2021 - Surgical Pathology / Lumpectomy.  2/1/2021 - Surgical Pathology / Left Breast Core Biopsy.    Imaging  2/26/2021 - Mammo Left Breast Specimen & Clip Placement.  2/1/2021 - Mammo Stereotactic Left Breast Biopsy.  1/25/2021 - Mammo Diagnostic Left.  1/21/2021 - Mammo Screening Bilateral.

## 2021-06-16 NOTE — CONFIDENTIAL NOTE
I met with the root for a very brief emotional support visit.  We met for about 10 minutes following her radiation therapy treatment.  We had an individual psychotherapy session last week on 3/31/2021.  Zaina started radiation therapy on Tuesday, 4/6/2021.  She feels that she is doing good with her start of treatment.  She also is feeling emotionally much better since her treatment has started.    Plan: Zaina will contact me on an as-needed basis if she is interested in additional support and assistance.    Emilia Mcarthur MA, LP, LICSW

## 2021-06-16 NOTE — CONSULTS
Consults   [facility] Radiation Oncology Consult Note     Patient: Zaina Cortez  MRN: [id number]  Date of Service: 03/24/2021          Joslyn Marie,   [address] [id number]       Dear Dr. Marie:    Thank you very much for referring this patient for consideration of radiotherapy. As you know Ms. Cortez is a 65 y.o. female with a diagnosis of left breast DCIS, ER/KS positive, status post lumpectomy with negative margin and closest margin measured 0.2 mm posterior.  Patient is referred to radiation oncology for evaluation and consideration of postop radiation therapy to prevent future development of invasive cancer.    HISTORY OF PRESENT ILLNESS:   Ms. Cortez is a 65 y.o. female who has been in her usual state of health until recently.  The patient presented with abnormal finding by a routine screening mammogram for which she was a seeking further evaluation.  The mammogram showed 0.6 x 2.2 x 0.8 cm heterogeneous calcification at 3 o'clock position of left breast suspicious for malignancy.  Patient underwent needle biopsy on 2/1/2021 with pathology showed grade 3 DCIS.  You have discussed with the patient about various treatment options, the patient elected proceed with breast preservation protocol as her treatment choice and underwent lumpectomy on 2/26/2021.  The pathology reviewed 37 x 7 x 3 mm high-grade DCIS, ER/KS positive, with negative margin and close margin was measured 0.2 mm posterior.  Postoperatively she has been recovering well.  She saw Dr. Alvarez, med oncology earlier today and adjuvant hormonal therapy is recommended.  The patient is here today for evaluation and consideration of postop radiation therapy.    CHEMOTHERAPY HISTORY: Concurrent Chemotherapy: No    RADIATION THERAPY HISTORY: Prior Radiation: No    IMPLANTED CARDIAC DEVICE: none     PREGNANCY: The patient is informed not to be pregnant during the radiation therapy.  She is post menopausal.    Current  Outpatient Medications   Medication Sig Dispense Refill     lisinopriL (PRINIVIL,ZESTRIL) 5 MG tablet TAKE 1 TABLET BY MOUTH EVERY DAY 90 tablet 3     anastrozole (ARIMIDEX) 1 mg tablet Take 1 tablet (1 mg total) by mouth daily. 30 tablet 1     No current facility-administered medications for this visit.      Past Medical History:   Diagnosis Date     Hypertension      Sleep apnea     Created by Conversion  Replacement Utility updated for latest IMO load, Before Gastrectomy Used a CPAP no long uses     Past Surgical History:   Procedure Laterality Date     MA STEREOTACTIC BREAST BIOPSY VACUUM L Left 2/1/2021     IL MASTECTOMY, PARTIAL Left 2/26/2021    Procedure: LEFT WIRE LOCALIZED LUMPECTOMY;  Surgeon: Joslyn Marie DO;  Location: [facility];  Service: General     IL REMOVAL GALLBLADDER      Description: Cholecystectomy;  Recorded: 06/01/2012;  Comments: 04/2008 PAULINA Maxwell     IL REMV STOMACH,PART,DISTAL,GASTRODUOD      Description: Partial Gastrectomy;  Recorded: 12/23/2013;  Comments: Sleeve Gastrectomy for Bariatric Purposes.     Amoxicillin and Iodinated contrast media  Family History   Problem Relation Age of Onset     Heart disease Mother      Kidney disease Mother      Cancer Maternal Grandfather      Dementia Paternal Grandfather      Congenital heart disease Brother      Breast cancer Paternal Aunt         Approx age in 60's     Social History     Socioeconomic History     Marital status:      Spouse name: Troy     Number of children: 0     Years of education: Not on file     Highest education level: Not on file   Occupational History     Occupation:    Social Needs     Financial resource strain: Not on file     Food insecurity     Worry: Not on file     Inability: Not on file     Transportation needs     Medical: Not on file     Non-medical: Not on file   Tobacco Use     Smoking status: Never Smoker     Smokeless tobacco: Never Used   Substance and Sexual Activity     Alcohol  use: Yes     Alcohol/week: 0.0 standard drinks     Comment: 2 drinks per week     Drug use: No     Sexual activity: Not Currently     Partners: Male     Birth control/protection: Post-menopausal   Lifestyle     Physical activity     Days per week: Not on file     Minutes per session: Not on file     Stress: Not on file   Relationships     Social connections     Talks on phone: Not on file     Gets together: Not on file     Attends Restorationist service: Not on file     Active member of club or organization: Not on file     Attends meetings of clubs or organizations: Not on file     Relationship status: Not on file     Intimate partner violence     Fear of current or ex partner: Not on file     Emotionally abused: Not on file     Physically abused: Not on file     Forced sexual activity: Not on file   Other Topics Concern     Not on file   Social History Narrative     Not on file      Review of Systems:      General  General (WDL): All general elements are within defined limits  EENT  ENT (WDL): Exceptions to WDL  Glasses or Contacts: Yes - Chronic (Greater than 3 months)(glasses only)  Respiratory   Respiratory (WDL): All respiratory elements are within defined limits  Cardiovascular  Cardiovascular (WDL): Exceptions to WDL  Irregular Heart Beat: Yes - Chronic (Greater than 3 months)(bundle branch block)  Endocrine  Endocrine (WDL): All endocrine elements are within defined limits  Gastrointestinal  Gastrointestinal (WDL): All gastrointestinal elements are within defined limits  Musculoskeletal  Musculoskeletal (WDL): All musculoskeletal elements are within defined limits  Integumentary                  Neurological  Neurological (WDL): All neurological elements are within defined limits  Dominant Hand: Right  Psychological/Emotional   Psychological/Emotional (WDL): All psychological/emotional elements are within defined limits  Hematological/Lymphatic  Hematological/Lymphatic (WDL): All hematological/lymphatic elements  are within defined limits  Dermatologic  Dermatologic (WDL): All dermatological elements are within defined limits  Genitourinary/Reproductive  Genitourinary/Reproductive (WDL): All genitourinary/reproductive elements are within defined limits  Reproductive     Pain              Currently in Pain: No/denies  Accompanied by     Imaging: Reviewed    Pathology: Reviewed    ECOG Peformance Status  ECOG Performance Status: 0  Distress Assessment Score: 5(visit today brings back memories of  who had cancer)    Objective:     PHYSICAL EXAMINATION:    BP (!) 177/96   Pulse 75   Temp 98.2  F (36.8  C) (Oral) Comment: done in medical oncology  Wt (!) 280 lb (127 kg)   SpO2 97% Comment: done in medical oncology  BMI 46.59 kg/m      Gen: Alert, in NAD  Eyes: PERRL, EOMI, sclera anicteric  HENT     Head: NC/AT     Ears: No external auricular lesions     Nose/sinus: No rhinorrhea or epistaxis     Oropharynx: MMM, no visible oral lesions  Neck: Supple, full ROM, no LAD  Chest: The breasts and nipples are symmetrical bilaterally.  There is no evidence of nipple discharge.  There is no palpable lump or mass bilaterally in the breast, axillary, and supraclavicular region.  There is well-healed surgical scar in the left breast consistent with a recent history of surgery.  Pulm: No wheezing, stridor or respiratory distress  CV: Well-perfused, no cyanosis, no pedal edema  Abdominal: BS+, soft, nontender, nondistended, no hepatomegaly  Back: No step-offs or pain to palpation along the thoracolumbar spine  Rectal: Deferred  : Deferred  Musculoskeletal: Normal muscle bulk and tone  Skin: Normal color and turgor  Neurologic: A/Ox3, CN II-XII intact, normal gait and station  Psychiatric: Appropriate mood and affect    Intent of Therapy: Curative    We recommend adjuvant radiation as part of her breast conserving therapy to decrease her chance of local recurrence to the single digits. ROM stretches and skin care were discussed  with the patient. She understands that these maneuvers need to continue for 6 months following completion of radiation as skin and muscle fibrosis continue to form for weeks to months following completion of therapy.      Side effects that may occur during or within weeks after radiation therapy      Fatigue and general weakness    Darkening, irritation, itchiness, redness, dryness, erythema, peeling, scabbing, ulceration and contraction of the skin of the breast and chest    Swelling of the breast    Loss of armpit hair    Lung irritation    Decrease in appetite    Side effects that may occur months or years after radiation therapy      Development of another tumor or cancer    Thickening, telangiectasias (development of spider like blood vessels in the skin) and ulceration of the skin of the breast and chest    Firming, fibrosis (scar tissue), fat necrosis, and distortion of the breast    Poor healing after a trauma or surgery in the irradiated area    Nerve damage resulting in loss of arm strength and sensation    Coronary artery blockage causing angina pain or a heart attack    Lung inflammation of fibrosis causing cough, fever and shortness of breath    Fracture of the ribs    Swellingof an arm and hand    The risks, benefits and alternatives to radiation therapy were outlined with the patient. All questions were answered and a consent was signed.     Impression     Left breast DCIS, ER/LA positive, status post lumpectomy with negative margin and closest margin measured 0.2 mm posterior.     Assessment & Plan:     I have personally reviewed her upcoming medical record today.  I have also reviewed her most recent radiology study including mammogram.  This is a 65-year-old female with a new diagnosis of left breast DCIS status post lumpectomy.  The possible treatment options including surgery, systemic therapy, and radiation therapy has been discussed with patient in detail and at great lengths.  The possible  risks and side effects of radiation therapy has also been explained to the patient.  Questions are answered to patient satisfaction.  I agree the patient is a good candidate to consider postop radiation therapy to further prevent future development of invasive breast cancer.  The pros and cons of different options has also been discussed with the patient.  After long discussion, the patient elected proceed with postop radiation therapy as her treatment choice being aware of potential risks and side effects involved.  She is scheduled to return to radiation oncology next few days for simulation.  I plan to give her radiation therapy at 265 cGy each fraction to a total of 4240 cGy in 16 treatments targeted to the left breast followed by additional 1000 Gy in 4 fractions given to the primary tumor bed using electron.  The patient indeed has a large breast tissue which may compromise the dosimetry planning with hypofractionation protocol.  We may consider to use IMRT or standard fractionation if the 3D hypofractionation protocol will not be able to provide a good dosimetry.    Again, thank you very much for the referral and allowing me to participate in the care of this patient.  If you have any questions or concerns about this consultation, please do not hesitate to call.  I spent approximately 45 minutes today with the patient and 80% time was used for counseling.        Sincerely,        Taylor Purcell MD, PhD  Department of Radiation Oncology   [facility]  Tel: [phone]  Page: [phone]    [facility]  [address] [place] [address]     [address] [place] [address]    CC:  Patient Care Team:  Hortencia Banuelos NP as PCP - General (Family Medicine)  Shannan Oneill CNP as Assigned PCP  Yasmany Alvarez MD as Physician (Hematology and Oncology)  Emily Yang CNP as Nurse Practitioner (Hematology and Oncology)  Emily Bello, DIAMANTE as Oncology  Nurse Navigator (Hematology and Oncology)  Taylor Purcell MD as Physician (Radiation Oncology)

## 2021-06-17 NOTE — PROGRESS NOTES
Assessment/Plan:     1. Routine health maintenance    2. Screening for lipid disorders  - Lipid [place] FASTING    3. Screening for diabetes mellitus  - Basic Metabolic Panel    4. Obesity  Continue to work on diet and exercise.  Consider recheck with bariatric surgeons.    5. Chest pain  Reassured by stress echo.  Patient is currently asymptomatic.  I do not see an indication for repeat testing at this time.  She will continue with increasing exercise and activity tolerance.  Follow up with worsening symptoms.        Subjective:   Zaina Cortez  is a 62 y.o. female who presents for an annual exam.  Pertinent history of bariatric surgery, right bundle branch block, and obesity.  Patient is not currently taking any medications.    Patient had a gastric sleeve 4.5 years ago.  She has started gaining some of the weight back.  According to our records, she has gained approximately 30 pounds in the last 2 years.  Patient was treated for sleep apnea prior to losing weight.  She has not used a CPAP since that time.  Patient denies any significant snoring, witnessed apneic episodes, or daytime fatigue.      History of hypertension prior to bariatric surgery.  Patient is not currently on any antihypertensive medications.  Blood pressure is within normal limits today.    Intermittent chest pain that comes and goes quickly.  History of RBBB seen on EKG.  Stress ECHO in 2015 showed RBBB without ischemia.  LVEF 60%.  Patient is not currently exercising.  Denies any radiation of pain or SOB.     Healthy Habits:   Regular Exercise: no  Sunscreen Use: no  Healthy Diet: no  Dental Visits Regularly: yes  Seat Belt: yes  Sexually active: yes  Self Breast Exam Monthly: yes  Hemoccults: na  Flex Sig: na  Colonoscopy: 2010  Lipid Profile: 2015  Glucose Screen: 2015  Prevention of Osteoporosis: na  Last Dexa: na    Immunization History   Administered Date(s) Administered     Td, Adult, Absorbed 12/31/1988, 05/03/2000     Tdap 12/07/2015        Immunization status: up to date and documented. Patient will check with her insurance regarding coverage of Zoster vaccination.     Gynecologic History  LMP: post menopausal  Contraception: none  Last Pap:    Last mammogram: 2018 Results were: normal    OB History      Para Term  AB Living      0       SAB TAB Ectopic Multiple Live Births                  No current outpatient prescriptions on file.     No current facility-administered medications for this visit.        Past Medical History:   Diagnosis Date     Hypertension      Past Surgical History:   Procedure Laterality Date     CT REMOVAL GALLBLADDER      Description: Cholecystectomy;  Recorded: 2012;  Comments: [facility] Dr[facility]R. Maxwell     CT REMV STOMACH,PART,DISTAL,GASTRODUOD      Description: Partial Gastrectomy;  Recorded: 2013;  Comments: Sleeve Gastrectomy for Bariatric Purposes.        Allergies   Allergen Reactions     Amoxicillin Hives     Iodinated Contrast- Oral And Iv Dye Hives     Family History   Problem Relation Age of Onset     Heart disease Mother      Kidney disease Mother      Cancer Maternal Grandfather      Dementia Paternal Grandfather      Congenital heart disease Brother      Breast cancer Paternal Aunt      Approx age in 60's     Social History     Social History     Marital status:      Spouse name: Troy     Number of children: 0     Years of education: N/A     Occupational History           Social History Main Topics     Smoking status: Never Smoker     Smokeless tobacco: Never Used     Alcohol use 0.0 oz/week     0 - 1 drink(s) per week     Drug use: No     Sexual activity: Yes     Partners: Male     Birth control/ protection: Post-menopausal     Other Topics Concern     Not on file     Social History Narrative        Review of Systems  Fourteen point review of systems negative except as mentioned in HPI    Objective:      Vitals:    18 0944   BP: 136/86   Patient Site:  "Right Arm   Patient Position: Sitting   Cuff Size: Adult Large   Pulse: 76   Weight: (!) 285 lb 8 oz (129.5 kg)   Height: 5' 5\" (1.651 m)     Body mass index is 47.51 kg/(m^2).    Physical Exam:  General Appearance: Alert, cooperative, no distress, appears stated age  Head: Normocephalic, without obvious abnormality, atraumatic  Eyes: PERRL, conjunctiva/corneas clear, EOM's intact  Ears: Normal TM's and external ear canals, both ears  Nose: Nares normal, septum midline,mucosa normal, no drainage  Throat: Lips, mucosa, and tongue normal; teeth and gums normal  Neck: Supple, symmetrical, trachea midline, no adenopathy;  thyroid: not enlarged, symmetric, no tenderness/mass/nodules; no carotid bruit or JVD  Back: Symmetric, no curvature, ROM normal, no CVA tenderness  Lungs: Clear to auscultation bilaterally, respirations unlabored  Breasts: No breast masses, tenderness, asymmetry, or nipple discharge.  Heart: Regular rate and rhythm, S1 and S2 normal, no murmur, rub, or gallop  Abdomen: Soft, non-tender, bowel sounds active all four quadrants,  no masses, no organomegaly  Pelvic:Not examined  Extremities: Extremities normal, atraumatic, no cyanosis or edema  Skin: Skin color, texture, turgor normal, no rashes or lesions  Lymph nodes: Cervical, supraclavicular, and axillary nodes normal  Neurologic: Normal   Psychologic: appropriate affective, answers all of my questions appropriately. No hallucinations, delusion, or suicidal ideations.    Hortencia Banuelos NP-C  "

## 2021-06-17 NOTE — PROGRESS NOTES
Patient here ambulatory for final radiation therapy treatment for her breast cancer.  Patient has bright erythema with small areas of dry desquamation under her breast and in the axilla.  Reinforced skin cares.  Written discharge instructions reviewed and given to patient.  Follow up with Dr. Purcell in about 4 to 6 weeks.  Patient verbalized understanding and left ambulatory.

## 2021-06-17 NOTE — TELEPHONE ENCOUNTER
Telephone Encounter by Ashley Toribio RN at 5/11/2021  9:37 AM     Author: Ashley Toribio RN Service: -- Author Type: Registered Nurse    Filed: 5/11/2021  9:39 AM Encounter Date: 5/11/2021 Status: Signed    : Ashley Toribio RN (Registered Nurse)       Cancer Care Refill Protocol Passed    Rerun Protocol (5/11/2021 9:30 AM)     Cancer Care visit in the last 12 months     Last office visit: 3/24/2021 Yasmany Alvarez MD Next office visit within 3 mo: Visit date not found  Last MTM visit: Visit date not found    Prescriber or current provider: Yasmany Alvarez MD  Last diagnosis associated with med order:   1. Breast neoplasm, Tis (DCIS), left  - anastrozole (ARIMIDEX) 1 mg tablet [Pharmacy Med Name: ANASTROZOLE 1 MG TABLET]; TAKE 1 TABLET BY MOUTH EVERY DAY  Dispense: 30 tablet; Refill: 1

## 2021-06-17 NOTE — TELEPHONE ENCOUNTER
Called Zaina to check in. She completed radiation treatment earlier this week and is experiencing expected side effects of fatigue and skin irritation. She is applying ointments as directed by the radiation team. She is is on a short term leave from work until 5/26 and appreciates being able to have the time to heal and be at her best before returning. She is already scheduled for her radiation follow up. Noted that follow up with Dr. Alvarez and Jose have not yet been scheduled. Scheduling number provided so that she can call and arrange her appointments. She is on anastrozole and is tolerating this ok. Will order a [employer], box of items filled with pampering items,, from [employer], to brighten her day. She was appreciative of the follow up call. She has writer's contact information for future reference.

## 2021-06-17 NOTE — PROGRESS NOTES
Nargis Gift was requested from [employer], with permission from Zaina, and will be sent out this month.

## 2021-06-17 NOTE — TELEPHONE ENCOUNTER
Called patient for routine follow up call s/p radiation for her breast cancer.  Patient states her skin is peeling but feels that it has started to improve since finishing treatment.  Continues with skin cares and had no further questions today.  Encouraged patient to call if she needed anything before her confirmed follow up visit.

## 2021-06-17 NOTE — PROGRESS NOTES
RADIATION ONCOLOGY WEEKLY TREATMENT VISIT NOTE      Assessment / Impression       1. 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  All questions and concerns addressed.    Plan:     Continue radiation treatment as prescribed.    Discussed with patient about skin care.    Subjective:      HPI: Zaina Cortez is a 65 y.o. female with    1. 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    Assessment                  Body Site: Breast                           Site: Left Breast  Stereotactic Radiosurgery: No  Concurrent Therapy: Yes(anastrozole)  Today's Dose: 4240  Total Dose for Breast: 5240  Today's Fraction/Total Fraction Breast: 16  Drainage: 0: Absent                                            Sexuality Alteration                 Emotional Alteration Copin: Effective  Comfort Alteration KPS: 80% Can perform normal activity with effort, some signs of disease  Fatigue (ONS scale) : 7: Extreme Fatigue  Pain Location: denies  Hot Flashes and/or Flushes: 1: Mild or no more than 1 per day   Nutrition Alteration Anorexia: 0: None  Nausea: 0: None  Vomitin: None  Skin Alteration Skin Sensation: 0: No problem  Skin Reaction: 2: Bright erythema  AUA Assessment                                  Accompanied by       Objective:     Exam: moderate Erythema.    Vitals:    21 1113   BP: (!) 162/96   Pulse: 72   Temp: 97.7  F (36.5  C)   TempSrc: Oral   SpO2: 98%   Weight: (!) 278 lb 14.4 oz (126.5 kg)       Wt Readings from Last 8 Encounters:   21 (!) 278 lb 14.4 oz (126.5 kg)   21 (!) 280 lb 8 oz (127.2 kg)   21 (!) 280  lb (127 kg)   04/06/21 (!) 278 lb 11.2 oz (126.4 kg)   03/24/21 (!) 280 lb (127 kg)   03/24/21 (!) 278 lb 3.2 oz (126.2 kg)   02/26/21 (!) 279 lb (126.6 kg)   02/18/21 (!) 279 lb 8 oz (126.8 kg)       General: Alert and oriented, in no acute distress  Zaina has moderate Erythema.  Aria chart and setup information reviewed    Taylor Purcell MD

## 2021-06-17 NOTE — PROGRESS NOTES
Patient here for daily radiation therapy treatment and stopped by nurses desk to ask for a return to work letter.  Patient planning to return to work on 5/26/2021.  Letter signed by Dr. Purcell and sent to Jesse.  Copy given to patient.    Jesse, Attn: Jania  Fax# [phone]  Claim # [id number]

## 2021-06-17 NOTE — PROGRESS NOTES
Radiation Treatment Summary    Patient: Zaina Cortez   MRN: [id number]  : 1955  Care Provider: Taylor Purcell    Date of Service: 2021        Joslyn Marie DO  [address] [address]            Dear Dr. Marie:     Your patient Mrs. Zaina Cortez completed her radiation therapy 5/3/2021. As you know Ms. Cortez is a 65 y.o. female with a diagnosis of left breast DCIS, ER/TN positive, status post lumpectomy with negative margin and closest margin measured 0.2 mm posterior.  She received postop radiation therapy for her breast cancer with a total dose of 5240 cGy in 20 treatments given from 2021-5/3/2021.  The patient tolerated radiation therapy well with expected acute side effect.  She is scheduled to return to radiation oncology in 4 weeks for routine post therapy office follow-up.    Again, thank you very much for the referral and allowing me to participate in the care of this patient.  If you have any questions or concerns about this consultation, please do not hesitate to call.            Sincerely,      Taylor Purcell MD, PhD  Department of Radiation Oncology   [facility]  Tel: [phone]  Page: [phone]    [facility]  [address] [place] [address]    CC:  Patient Care Team:  Hortencia Banuelos NP as PCP - General (Family Medicine)  Shannan Oneill CNP as Assigned PCP  Yasmany Alvarez MD as Physician (Hematology and Oncology)  Emily Yang CNP as Nurse Practitioner (Hematology and Oncology)  Emily Bello RN as Oncology Nurse Navigator (Hematology and Oncology)  Taylor Purcell MD as Physician (Radiation Oncology)  Taylor Purcell MD as Assigned Cancer Care Provider

## 2021-06-18 NOTE — PATIENT INSTRUCTIONS - HE
Patient Instructions by Shannan Oneill CNP at 11/11/2020 11:00 AM     Author: Shannan Oneill CNP Service: -- Author Type: Nurse Practitioner    Filed: 11/11/2020 11:16 AM Encounter Date: 11/11/2020 Status: Addendum    : Shannan Oneill CNP (Nurse Practitioner)    Related Notes: Original Note by Shannan Oneill CNP (Nurse Practitioner) filed at 11/11/2020 11:16 AM       Continue to take your blood pressure one hour after your lisinopril. Record your readings, bring them with you to your follow up appointment.    It is normal for the left ear to be a little sore for the next 24 hours after removing the ear wax. Please let me know if you develop a fever, chills, or worsening ear pain.    I will plan on seeing you back in a month with fasted labs and a blood pressure recheck.    Start working on diet and exercise like we talked about today.   Patient Education     Eating Heart-Healthy Foods  Eating has a big impact on your heart health. In fact, eating healthier can improve several of your heart risks at once. For instance, it helps you manage weight, cholesterol, and blood pressure. Here are ideas to help you make heart-healthy changes without giving up all the foods and flavors you love.  Getting started    Talk with your healthcare provider about eating plans, such as the DASH or Mediterranean diet. You may also be referred to a dietitian.    Change a few things at a time. Give yourself time to get used to a few eating changes before adding more.    Work to create a tasty, healthy eating plan that you can stick to for the rest of your life.    Goals for healthy eating  Below are some tips to improve your eating habits:    Limit saturated fats and trans fats. Saturated fats raise your levels of cholesterol, so keep these fats to a minimum. They are found in foods such as fatty meats, whole milk, cheese, and palm and coconut oils. Avoid trans fats because they lower good  cholesterol as well as raise bad cholesterol. Trans fats are most often found in processed foods.    Reduce sodium (salt) intake. Eating too much salt may increase your blood pressure. Limit your sodium intake to 2,300 milligrams (mg) per day (the amount in 1 teaspoon of salt), or less if your healthcare provider recommends it. Dining out less often and eating fewer processed foods are two great ways to decrease the amount of salt you consume.    Managing calories. A calorie is a unit of energy. Your body burns calories for fuel, but if you eat more calories than your body burns, the extras are stored as fat. Your healthcare provider can help you create a diet plan to manage your calories. This will likely include eating healthier foods as well as exercising regularly. To help you track your progress, keep a diary to record what you eat and how often you exercise.  Choose the right foods  Aim to make these foods staples of your diet. If you have diabetes, you may have different recommendations than what is listed here:    Fruits and vegetables provide plenty of nutrients without a lot of calories. At meals, fill half your plate with these foods. Split the other half of your plate between whole grains and lean protein.    Whole grains are high in fiber and rich in vitamins and nutrients. Good choices include whole-wheat bread, pasta, and brown rice.    Lean proteins give you nutrition with less fat. Good choices include fish, skinless chicken, and beans.    Low-fat or nonfat dairy provides nutrients without a lot of fat. Try low-fat or nonfat milk, cheese, or yogurt.    Healthy fats can be good for you in small amounts. These are unsaturated fats, such as olive oil, nuts, and fish. Try to have at least 2 servings per week of fatty fish, such as salmon, sardines, mackerel, rainbow trout, and albacore tuna. These contain omega-3 fatty acids, which are good for your heart. Flaxseed is another source of a heart-healthy  fat.  More on heart-healthy eating  Read food labels  Healthy eating starts at the grocery store. Be sure to pay attention to food labels on packaged foods. Look for products that are high in fiber and protein, and low in saturated fat, cholesterol, and sodium. Avoid products that contain trans fat. And pay close attention to serving size. For instance, if you plan to eat two servings, double all the numbers on the label.  Prepare food right  A key part of healthy cooking is cutting down on added fat and salt. Look on the internet for lower-fat, lower-sodium recipes. Also, try these tips:    Remove fat from meat and skin from poultry before cooking.    Skim fat from the surface of soups and sauces.    Broil, boil, bake, steam, grill, and microwave food without added fats.    Choose ingredients that spice up your food without adding calories, fat, or sodium. Try these items: horseradish, hot sauce, lemon, mustard, nonfat salad dressings, and vinegar. For salt-free herbs and spices, try basil, cilantro, cinnamon, pepper, and rosemary.  Date Last Reviewed: 10/1/2017    4017-5951 [employer]. [address] [place] [address], [place] [address]. All rights reserved. This information is not intended as a substitute for professional medical care. Always follow your healthcare professional's instructions.

## 2021-06-18 NOTE — PATIENT INSTRUCTIONS - HE
Patient Instructions by Shannan Oneill CNP at 10/28/2020 10:40 AM     Author: Shannan Oneill CNP Service: -- Author Type: Nurse Practitioner    Filed: 10/28/2020 11:06 AM Encounter Date: 10/28/2020 Status: Addendum    : Shannan Oneill CNP (Nurse Practitioner)    Related Notes: Original Note by Shannan Oneill CNP (Nurse Practitioner) filed at 10/28/2020 11:05 AM       Get a blood pressure cuff anywhere over-the-counter, such as [facility] or [facility].    Monitor your blood pressure in the morning and in the evening, record these values.    Watch your salt intake goals would be less than 3 g of sodium per day.    Watch your portion sizes of food.  Eat more low-fat protein sources (chicken, turkey, good fat sources such as fish), fruits, and vegetables.    Goals for exercise are 150 minutes/week.  Start slow titrate up time as tolerated.    You received your flu shot today.  Is normal for injection site to be sore or red for the next 24 to 48 hours.    Call your insurance company and discuss the shingles vaccine with them.  In clinic it costs approximately $$357.    I will plan on seeing you back in about 2 weeks for blood pressure recheck, before then if anything comes up.  Patient Education     Discharge Instructions for High Blood Pressure (Hypertension)  You have been diagnosed with high blood pressure (hypertension). This means the force of blood against your artery walls is too strong. It also means your heart is working hard to move blood. High blood pressure usually has no symptoms, but over time, it can cause serious health problems. High blood pressure raises your risk for heart attack, stroke, heart disease, heart failure, kidney disease, and vision loss. With help from your doctor, you can manage your blood pressure and protect your health.  Blood pressure measurements are given as 2 numbers. Systolic blood pressure is the upper number. This is the pressure  when the heart contracts or pumps. Diastolic blood pressure is the lower number. This is the pressure when the heart relaxes between beats.  Blood pressure is categorized as normal, elevated, or stage 1 or stage 2 high blood pressure:    Normal blood pressure is systolic of less than 120 and diastolic of less than 80 (120/80) at rest    Elevated blood pressure is systolic of 120 to 129 and diastolic less than 80 at rest    Stage 1 high blood pressure is systolic is 130 to 139 or diastolic between 80 to 89 at rest    Stage 2 high blood pressure is when systolic is 140 or higher or the diastolic is 90 or higher at rest  Taking medicine    Learn to measure your own blood pressure. Keep a record of your results. Ask your doctor which readings mean that you need medical attention.    Take your blood pressure medicine exactly as directed. Dont skip doses. Missing doses can cause your blood pressure to get out of control.    If you do miss a dose (or doses) check with your healthcare provider about what to do.    Don't take medicines that contain heart stimulants, including over-the-counter medicines. Check for warnings about high blood pressure on the label. Ask the pharmacist before purchasing something you haven't used before    Check with your doctor or pharmacist before taking a decongestant such as pseudoephedrine or phenylephrine. Some decongestants can worsen high blood pressure.    Lifestyle changes    Stay at a healthy weight. Get help to lose any extra pounds. Often times meeting with a dietitian can help you identify changes that can be made to your diet to help with weight loss.    Cut back on salt.  ? Limit canned, dried, packaged, and fast foods.  ? Dont add salt to your food at the table.  ? Season foods with herbs instead of salt when you cook.  ? Request no added salt when you go to a restaurant.  ? The American Heart Association (AHA) says the ideal amount of sodium is no more than 1,500 mg a day.  But  because Americans eat so much salt, you can make a positive change by cutting back to even 2,300 mg of sodium a day (1 teaspoon).     Follow the DASH (Dietary Approaches to Stop Hypertension) eating plan. This plan recommends vegetables, fruits, whole gains, and other heart healthy foods.    Eat food rich in potassium.    Begin an exercise program. Ask your healthcare provider how to get started. The AHA recommends aerobic exercise 3 to 4 times a week for an average of 40 minutes at a time to lower blood pressure, with your provider's approval. Simple activities such as walking or gardening can help.    Break the smoking habit. Enroll in a stop-smoking program to improve your chances of success. Ask your healthcare provider about programs and medicines to help you stop smoking.    Limit drinks that contain caffeine such as coffee, black or green tea, and cola to 2 per day.    Never take stimulants such as amphetamines or cocaine. These drugs can be deadly for someone with high blood pressure.    Control your stress. Learn ways to manage stress.    Limit alcohol to no more than 1 drink a day for women and 2 drinks a day for men.    Follow-up care  Make a follow-up appointment as directed.  When to call your healthcare provider  Call your healthcare provider right away if you have any of the following:    Chest pain or shortness of breath (call 911)    Moderate to severe headache    Weakness in the muscles of your face, arms, or legs    Trouble speaking    Extreme drowsiness    Confusion    Fainting or dizziness    Pulsating or rushing sound in your ears    Unexplained nosebleed    Weakness, tingling, or numbness of your face, arms, or legs    Change in vision    Blood pressure measured at home that is greater than 180/110  Date Last Reviewed: 4/27/2016 2000-2019 [employer]. [address] [place] [address]. All rights reserved. This information is not intended as a substitute for  professional medical care. Always follow your healthcare professional's instructions.           Patient Education     Discharge Instructions: Taking Your Blood Pressure  Blood pressure is the force of blood as it moves from the heart through the blood vessels. You can take your own blood pressure reading using a digital monitor. Take readings as often as your healthcare provider instructs. Take your readings each time in the same way, using the same arm. Here are guidelines for taking your blood pressure.  The American Heart Association (AHA) recommends purchasing a blood pressure monitor that is validated and approved by the Association for the Advancement of Medical Instrumentation, the [place] Hypertension Society, and the International Protocol for the Validation of Automated BP Measuring Devices. If the blood pressure monitor is for a senior adult, a pregnant woman, or a child, make certain it is validated for use with such a population. For the most reliable readings, the AHA recommends an automatic, cuff-style, upper arm (bicep) monitor. The readings from finger and wrist monitors are not as reliable as the upper arm monitor.        Step 1. Relax      Wait at least a half hour after smoking, eating, or exercising. Do not drink coffee, tea, soda, or other caffeinated beverages before checking your blood pressure.     Sit comfortably at a table. Place the monitor near you.    Rest for a few minutes before you begin.        Step 2. Wrap the cuff      Place your arm on the table, palm up. Put your arm in a position that is level with your heart. Wrap the cuff around your upper arm, about an inch above your elbow. Its best to wrap the cuff on bare skin, not over clothing.    Make sure your cuff fits. If it doesnt wrap around your upper arm, order a larger cuff. A cuff that is too large or too small can result in an inaccurate blood pressure reading.           Step 3. Inflate the cuff      Pump the cuff until the  scale reads 200. If you have a self-inflating cuff, push the button that starts the pump.    The cuff will tighten, then loosen.    The numbers will change. When they stop changing, your blood pressure reading will appear.    If you get a reading that is too high or too low for you, relax for a few minutes. Then do the test again.    Step 4. Write down the results    Write down your blood pressure numbers. Ortiz the date and time. Keep your results in one place, such as a notebook.    Remove the cuff from your arm. Turn off the machine.    Take the readings with you to your medical appointments.    If you start a new blood pressure medicine, or change a blood pressure medicine dose, note the day you started the new drug or dosage on your blood pressure recording sheet. This will help your healthcare provider monitor the effect of medication changes.     Date Last Reviewed: 4/27/2016 2000-2016 [employer]. [address] [place] [address], [place] [address]. All rights reserved. This information is not intended as a substitute for professional medical care. Always follow your healthcare professional's instructions.

## 2021-06-21 NOTE — LETTER
Letter by Emily Bello RN at      Author: Emily Bello RN Service: -- Author Type: --    Filed:  Encounter Date: 3/15/2021 Status: (Other)       Dear Zaina,    Thank you for choosing [facility] for your care.  We are committed to providing you with the highest quality and compassionate healthcare services.  The following information pertains to your first appointment with our clinic.    Date/Time of appointment:  Wednesday, March 24, 2021, check in at 1:00 p.m.    Due to the pandemic, we currently have a visitor restriction policy in place for the safety of our patients and staff members. We ask that you please arrive to our facility physically alone. You may have a supportive person listen into your consult by phone. We also ask that you wear a face covering when you enter our facility. We appreciate your understanding.      Name of your Physician: Dr. Taylor Purcell    What to bring to your appointment:    Completed Patient History/Initial Nursing Assessment     Your current insurance card(s).    Parking:    Please refer to the map included below to direct you to [facility]    The Cancer Care parking lot is west of the main hospital entrance. This is free parking and is located right next to the Cancer Care entrance.    Come in the Cancer Care entrance and check in at the .              [facility]              [address] [facility]              [place] [address]      We hope these instructions are helpful to you.  If you have any questions or concerns, please call us at [phone].  It is our pleasure to assist you.    Warm Regards,  Eimly Bello RN, OCN, CBCN  Nurse Navigator  [phone]

## 2021-06-21 NOTE — LETTER
Letter by Emily Bello RN at      Author: Emily Bello RN Service: -- Author Type: --    Filed:  Encounter Date: 3/15/2021 Status: (Other)       Dear Zaina,    Thank you for choosing [facility] for your care.  We are committed to providing you with the highest quality and compassionate healthcare services.  The following information pertains to your first appointment with our clinic.    Date/Time of appointment:  Wednesday, March 24.2021, check in at 10:30 a.m.    Due to the pandemic, we currently have a visitor restriction policy in place for the safety of our patients and staff members. We ask that you please arrive to our facility physically alone. You may have a supportive person listen into your consult by phone. We also ask that you wear a face covering when you enter our facility. We appreciate your understanding.    Name of your Physician: Yasmany Alvarez MD    What to bring to your appointment:    Completed Patient History/Initial Nursing Assessment .    Your current insurance card(s).    Parking:    Please refer to the map included below to direct you to     The [facility] parking lot is west of the main hospital entrance. This is free parking and is located right next to the Cancer Care entrance.    Come in the Cancer Care entrance and check in at the .              [facility]             [address] [facility]             [address] [address]      We hope these instructions are helpful to you.  If you have any questions or concerns, please call us at [phone].  It is our pleasure to assist you.    Warm Regards,  Emily Bello RN, OCN [facility]  Nurse Navigator  [phone]

## 2021-06-21 NOTE — LETTER
Letter by Alcira James RN at      Author: Alcira James RN Service: -- Author Type: --    Filed:  Encounter Date: 4/28/2021 Status: (Other)         April 28, 2021     Patient: Zaina Cortez   YOB: 1955, Claim # [id number]   Date of Visit: 4/28/2021       To Whom It May Concern:    It is my medical opinion that Zaina Cortez may return to work on 05/26/2021 full duty as she will have finished and recovered from her cancer treatment by that time.    If you have any questions or concerns, please don't hesitate to call.    Sincerely,        Electronically signed by Dr. Taylor Purcell MD, Radiation Oncology

## 2021-06-24 ENCOUNTER — OFFICE VISIT - HEALTHEAST (OUTPATIENT)
Dept: ONCOLOGY | Facility: CLINIC | Age: 66
End: 2021-06-24

## 2021-06-24 ENCOUNTER — COMMUNICATION - HEALTHEAST (OUTPATIENT)
Dept: FAMILY MEDICINE | Facility: CLINIC | Age: 66
End: 2021-06-24

## 2021-06-24 DIAGNOSIS — Z79.811 ENCOUNTER FOR MONITORING ANASTROZOLE THERAPY: ICD-10-CM

## 2021-06-24 DIAGNOSIS — D05.12 BREAST NEOPLASM, TIS (DCIS), LEFT: ICD-10-CM

## 2021-06-24 DIAGNOSIS — E66.01 MORBID OBESITY (H): ICD-10-CM

## 2021-06-24 DIAGNOSIS — Z51.81 ENCOUNTER FOR MONITORING ANASTROZOLE THERAPY: ICD-10-CM

## 2021-06-24 RX ORDER — ANASTROZOLE 1 MG/1
1 TABLET ORAL DAILY
Qty: 90 TABLET | Refills: 1 | Status: SHIPPED | OUTPATIENT
Start: 2021-06-24 | End: 2022-05-05

## 2021-06-26 ENCOUNTER — HEALTH MAINTENANCE LETTER (OUTPATIENT)
Age: 66
End: 2021-06-26

## 2021-06-26 NOTE — PROGRESS NOTES
Patient here ambulatory for follow up s/p radiation for her breast cancer.  Patient states she is doing well and skin healing from treatment.  Patient is back to work full time.  Seen by Dr. Purcell.  Plan RTC for follow up as directed by physician.

## 2021-06-26 NOTE — PROGRESS NOTES
[facility] Radiation Oncology Follow Up     Patient: Zaina Cortez  MRN: [id number]  Date of Service: 06/09/2021       DISEASE TREATED:  Left breast DCIS, ER/MD positive, status post lumpectomy with negative margin and closest margin measured 0.2 mm posterior.       TYPE OF RADIATION THERAPY ADMINISTERED: 5240 cGy in 20 treatments given from 4/6/2021-5/3/2021.     INTERVAL SINCE COMPLETION OF RADIATION THERAPY: 1 month.      SUBJECTIVE:  Ms. Cortez is a 65 y.o. female who has been in her usual state of health until recently.  The patient presented with abnormal finding by a routine screening mammogram for which she was a seeking further evaluation.  The mammogram showed 0.6 x 2.2 x 0.8 cm heterogeneous calcification at 3 o'clock position of left breast suspicious for malignancy.  Patient underwent needle biopsy on 2/1/2021 with pathology showed grade 3 DCIS.  You have discussed with the patient about various treatment options, the patient elected proceed with breast preservation protocol as her treatment choice and underwent lumpectomy on 2/26/2021.  The pathology reviewed 37 x 7 x 3 mm high-grade DCIS, ER/MD positive, with negative margin and close margin was measured 0.2 mm posterior. She received postop radiation therapy for her breast cancer with a total dose of 5240 cGy in 20 treatments given from 4/6/2021-5/3/2021.  The patient tolerated radiation therapy well with expected acute side effect.     The patient has been recovering well since completion of radiation therapy.  She denies any pain and discomfort related to the therapy at time of evaluation.  She is here for routine post therapy office follow-up.    Medications were reviewed and are up to date on EPIC.    The following portions of the patient's history were reviewed and updated as appropriate: allergies, current medications, past family history, past medical history, past social history, past surgical history and problem list.    Review of  Systems:      General  Constitutional (WDL): Exceptions to WDL  Fatigue: Fatigue relieved by rest  Hot flashes/Night Sweats: Mild symptoms, no intervention needed  EENT  Eye Disorder (WDL): All eye disorder elements are within defined limits(wears glasses)  Ear Disorder (WDL): All ear disorder elements are within defined limits  Respiratory   Respiratory (WDL): Within Defined Limits  Cardiovascular  Cardiovascular (WDL): All cardiovascular elements are within defined limits  Endocrine     Gastrointestinal  Gastrointestinal (WDL): All gastrointestinal elements are within defined limits  Musculoskeletal  Musculoskeletal and Connetive Tissue Disorders (WDL): All Musculoskeletal and Connetive Tissue Disorder elements are within defined limits  Integumentary               Integumentary (WDL): Exceptions to WDL(radiation dermatitis healing)  Neurological  Neurosensory (WDL): All neurosensory elements are within defined limits  Psychological/Emotional   Patient Coping: Open/discussion  Hematological/Lymphatic  Lymph (WDL): All lymph disorder elements are within defined limits  Dermatologic     Genitourinary/Reproductive  Genitourinary (WDL): All genitourinary elements are within defined limits  Reproductive     Pain              Currently in Pain: No/denies  Accompanied by  Accompanied by: Alone    Objective:     PHYSICAL EXAMINATION:    /82   Pulse 83   Temp 97.9  F (36.6  C) (Oral)   Wt (!) 283 lb 4.8 oz (128.5 kg)   SpO2 98%   BMI 47.14 kg/m      Gen: Alert, in NAD  Eyes: PERRL, EOMI, sclera anicteric  Pulm: No wheezing, stridor or respiratory distress  CV: Well-perfused, no cyanosis, no pedal edema  Abdominal: BS+, soft, nontender, nondistended, no hepatomegaly  Back: No step-offs or pain to palpation along the thoracolumbar spine  Rectal: Deferred  : Deferred  Musculoskeletal: Normal muscle bulk and tone  Skin: Normal color and turgor.  Skin within the radiation therapy field shows post therapy  changes.  Neurologic: A/Ox3, CN II-XII intact, normal gait and station  Psychiatric: Appropriate mood and affect      Impression     The patient is a 65-year-old female with a diagnosis of left breast DCIS status post surgery followed by postop radiation therapy completed 4 weeks ago.  Patient has been doing well with no evidence of recurrence.    Assessment & Plan:     1.  Continue long-term follow-up and ongoing care for her left breast DCIS with Dr. Alvarez, med oncology and Dr. Marie, breast surgeon as planned.    2.  Follow-up with radiation oncology as needed.      Face to face time  15 minutes with > 100% spent on consultation, education and coordination of care.      Taylor Purcell MD, PhD  Department of Radiation Oncology   [facility]  Tel: [phone]  Page: [phone]    [id number] [place] [address]     [address] [place] [address]    CC:  Patient Care Team:  Hortencia Banuelos NP as PCP - General (Family Medicine)  Shannan Oneill CNP as Assigned PCP  Yasmany Alvarez MD as Physician (Hematology and Oncology)  Emily Yang CNP as Nurse Practitioner (Hematology and Oncology)  Taylor Purcell MD as Physician (Radiation Oncology)  Taylor Purcell MD as Assigned Cancer Care Provider

## 2021-06-29 NOTE — PROGRESS NOTES
Progress Notes by Shannan Oneill CNP at 10/28/2020 10:40 AM     Author: Shannan Oneill CNP Service: -- Author Type: Nurse Practitioner    Filed: 10/28/2020 11:30 AM Encounter Date: 10/28/2020 Status: Signed    : Shannan Oneill CNP (Nurse Practitioner)       Clinic Note    Assessment:     Assessment and Plan:  1. H/O cold sores: Left lower lip. Will screen for HSV. No other vaginal or urinary concerns. Will not do a full STI work up today.   - Herpes simplex Virus (Type 1 and 2) IgG Antibody    2. Morbid obesity (H): Weight today was 275 pounds. She reports gaining about 15 pounds during COVID-19. She has not been eating well or exercising. Encouraged her to start watching her portion sizes, cutting back on salt, and start exercising again. Goals for exercise are 150 minutes per week, starting slowly.     3. Elevated blood pressure reading without diagnosis of hypertension: Recheck of blood pressure in the office was 134/92. Encouraged her to buy a blood pressure cuff. Monitor in AM and PM, record, 2 week follow up. Discussed watching salt intake, goals are less than 3 grams per day. Discussed portion sizes, and exercise goals as above.       Patient Instructions   Get a blood pressure cuff anywhere over-the-counter, such as Smart[employer] or [employer]CO[employer].    Monitor your blood pressure in the morning and in the evening, record these values.    Watch your salt intake goals would be less than 3 g of sodium per day.    Watch your portion sizes of food.  Eat more low-fat protein sources (chicken, turkey, good fat sources such as fish), fruits, and vegetables.    Goals for exercise are 150 minutes/week.  Start slow titrate up time as tolerated.    You received your flu shot today.  Is normal for injection site to be sore or red for the next 24 to 48 hours.    Call your insurance company and discuss the shingles vaccine with them.  In clinic it costs approximately $$357.    I will plan on  seeing you back in about 2 weeks for blood pressure recheck, before then if anything comes up.  Patient Education     Discharge Instructions for High Blood Pressure (Hypertension)  You have been diagnosed with high blood pressure (hypertension). This means the force of blood against your artery walls is too strong. It also means your heart is working hard to move blood. High blood pressure usually has no symptoms, but over time, it can cause serious health problems. High blood pressure raises your risk for heart attack, stroke, heart disease, heart failure, kidney disease, and vision loss. With help from your doctor, you can manage your blood pressure and protect your health.  Blood pressure measurements are given as 2 numbers. Systolic blood pressure is the upper number. This is the pressure when the heart contracts or pumps. Diastolic blood pressure is the lower number. This is the pressure when the heart relaxes between beats.  Blood pressure is categorized as normal, elevated, or stage 1 or stage 2 high blood pressure:    Normal blood pressure is systolic of less than 120 and diastolic of less than 80 (120/80) at rest    Elevated blood pressure is systolic of 120 to 129 and diastolic less than 80 at rest    Stage 1 high blood pressure is systolic is 130 to 139 or diastolic between 80 to 89 at rest    Stage 2 high blood pressure is when systolic is 140 or higher or the diastolic is 90 or higher at rest  Taking medicine    Learn to measure your own blood pressure. Keep a record of your results. Ask your doctor which readings mean that you need medical attention.    Take your blood pressure medicine exactly as directed. Dont skip doses. Missing doses can cause your blood pressure to get out of control.    If you do miss a dose (or doses) check with your healthcare provider about what to do.    Don't take medicines that contain heart stimulants, including over-the-counter medicines. Check for warnings about high  blood pressure on the label. Ask the pharmacist before purchasing something you haven't used before    Check with your doctor or pharmacist before taking a decongestant such as pseudoephedrine or phenylephrine. Some decongestants can worsen high blood pressure.    Lifestyle changes    Stay at a healthy weight. Get help to lose any extra pounds. Often times meeting with a dietitian can help you identify changes that can be made to your diet to help with weight loss.    Cut back on salt.  ? Limit canned, dried, packaged, and fast foods.  ? Dont add salt to your food at the table.  ? Season foods with herbs instead of salt when you cook.  ? Request no added salt when you go to a restaurant.  ? The American Heart Association (AHA) says the ideal amount of sodium is no more than 1,500 mg a day.  But because Americans eat so much salt, you can make a positive change by cutting back to even 2,300 mg of sodium a day (1 teaspoon).     Follow the DASH (Dietary Approaches to Stop Hypertension) eating plan. This plan recommends vegetables, fruits, whole gains, and other heart healthy foods.    Eat food rich in potassium.    Begin an exercise program. Ask your healthcare provider how to get started. The AHA recommends aerobic exercise 3 to 4 times a week for an average of 40 minutes at a time to lower blood pressure, with your provider's approval. Simple activities such as walking or gardening can help.    Break the smoking habit. Enroll in a stop-smoking program to improve your chances of success. Ask your healthcare provider about programs and medicines to help you stop smoking.    Limit drinks that contain caffeine such as coffee, black or green tea, and cola to 2 per day.    Never take stimulants such as amphetamines or cocaine. These drugs can be deadly for someone with high blood pressure.    Control your stress. Learn ways to manage stress.    Limit alcohol to no more than 1 drink a day for women and 2 drinks a day for  men.    Follow-up care  Make a follow-up appointment as directed.  When to call your healthcare provider  Call your healthcare provider right away if you have any of the following:    Chest pain or shortness of breath (call 911)    Moderate to severe headache    Weakness in the muscles of your face, arms, or legs    Trouble speaking    Extreme drowsiness    Confusion    Fainting or dizziness    Pulsating or rushing sound in your ears    Unexplained nosebleed    Weakness, tingling, or numbness of your face, arms, or legs    Change in vision    Blood pressure measured at home that is greater than 180/110  Date Last Reviewed: 4/27/2016 2000-2019 [employer]. [address] [place] [id number]. All rights reserved. This information is not intended as a substitute for professional medical care. Always follow your healthcare professional's instructions.           Patient Education     Discharge Instructions: Taking Your Blood Pressure  Blood pressure is the force of blood as it moves from the heart through the blood vessels. You can take your own blood pressure reading using a digital monitor. Take readings as often as your healthcare provider instructs. Take your readings each time in the same way, using the same arm. Here are guidelines for taking your blood pressure.  The American Heart Association (AHA) recommends purchasing a blood pressure monitor that is validated and approved by the Association for the Advancement of Medical Instrumentation, the [place] Hypertension Society, and the International Protocol for the Validation of Automated BP Measuring Devices. If the blood pressure monitor is for a senior adult, a pregnant woman, or a child, make certain it is validated for use with such a population. For the most reliable readings, the AHA recommends an automatic, cuff-style, upper arm (bicep) monitor. The readings from finger and wrist monitors are not as reliable as the upper arm  monitor.        Step 1. Relax      Wait at least a half hour after smoking, eating, or exercising. Do not drink coffee, tea, soda, or other caffeinated beverages before checking your blood pressure.     Sit comfortably at a table. Place the monitor near you.    Rest for a few minutes before you begin.        Step 2. Wrap the cuff      Place your arm on the table, palm up. Put your arm in a position that is level with your heart. Wrap the cuff around your upper arm, about an inch above your elbow. Its best to wrap the cuff on bare skin, not over clothing.    Make sure your cuff fits. If it doesnt wrap around your upper arm, order a larger cuff. A cuff that is too large or too small can result in an inaccurate blood pressure reading.           Step 3. Inflate the cuff      Pump the cuff until the scale reads 200. If you have a self-inflating cuff, push the button that starts the pump.    The cuff will tighten, then loosen.    The numbers will change. When they stop changing, your blood pressure reading will appear.    If you get a reading that is too high or too low for you, relax for a few minutes. Then do the test again.    Step 4. Write down the results    Write down your blood pressure numbers. Ortiz the date and time. Keep your results in one place, such as a notebook.    Remove the cuff from your arm. Turn off the machine.    Take the readings with you to your medical appointments.    If you start a new blood pressure medicine, or change a blood pressure medicine dose, note the day you started the new drug or dosage on your blood pressure recording sheet. This will help your healthcare provider monitor the effect of medication changes.     Date Last Reviewed: 4/27/2016 2000-2016 [employer]. [address] [place] [address], [place] [address]. All rights reserved. This information is not intended as a substitute for professional medical care. Always follow your healthcare professional's instructions.              Return in about 2 weeks (around 11/11/2020).         Subjective:      Zaina Cortez is a 65 y.o. female resents today for immunizations and possible STD testing.    She is a patient of Hortencia Banuelos CNP.    She reports that she has been  for the past 4 years, has been dating off and on.  She reports recently eating a gentleman who she has been seeing on a regular basis.  She reports having a cold sore on her bottom lip, which spread to him.  She reports being concerned that she might have genital herpes as well.  She denies any lesions or outbreaks.  She denies any vaginal discharge, discomfort, or pain with intercourse.  She denies any urinary urgency, frequency, or burning.    She would like a flu shot today. Discussed that she should check with her insurance company about the shingles vaccine as it is expensive here if insurance does not cover this.    She reports that at her dentist office about 6 weeks ago, she noticed her blood pressure was elevated. She reports that during COVID-19 she has gained about 15 pounds, and has not been eating well. She has increased her salt intake, and has not been exercising. She reports having an exercise bike at home that she can use, she just has not been using this.    She denies a fever, chills, cough, shortness of breath, chest pain, chest pressure, nausea, vomiting, abdominal pain, urinary, or bowel symptoms today.     The following portions of the patient's history were reviewed and updated as appropriate.    Review of Systems:    Review is otherwise negative except for what is mentioned above.     Social Hx:    Social History     Tobacco Use   Smoking Status Never Smoker   Smokeless Tobacco Never Used         Objective:     Vitals:    10/28/20 1049 10/28/20 1114   BP: (!) 149/98 (!) 134/92   Pulse: 77    SpO2: 96%    Weight: (!) 275 lb (124.7 kg)        Exam:  General: No apparent distress. Calm. Alert and Oriented X3. Pt behavior is  appropriate.  Head:Atraumatic. Normocephalic, non-tender to palpation.  Lungs: Clear to auscultation, normal respiratory effort and rate.   Heart/Pulses: Regular rate and rhythm, no murmurs, gallops, or rubs. Capillary refill <2 seconds. No edema.   Musculoskeletal: No CVA tenderness with palpation. Good ROM with extremities.   Neurologic: Interactive, alert, no focal findings.  Skin: Warm, dry. Normal skin turgor.       Patient Active Problem List   Diagnosis   ? Obesity   ? Sleep Apnea   ? Vitamin D Deficiency   ? Hypertension   ? TMJ (dislocation of temporomandibular joint)   ? RBBB   ? Elevated blood pressure, situational   ? Morbid obesity (H)     No current outpatient medications on file.     No current facility-administered medications for this visit.      Shannan Oneill, Adult-Geriatric Nurse Practitioner  [facility] - Internal Medicine Team     10/28/2020

## 2021-06-29 NOTE — PROGRESS NOTES
Progress Notes by Shannan Oneill CNP at 11/11/2020 11:00 AM     Author: Shannan Oneill CNP Service: -- Author Type: Nurse Practitioner    Filed: 11/11/2020 11:46 AM Encounter Date: 11/11/2020 Status: Signed    : Shannan Oneill CNP (Nurse Practitioner)       Clinic Note    Assessment:     Assessment and Plan:  1. Essential hypertension: Blood pressure continues to be high. Will start on Lisinopril. Discussed possible side effects. Encouraged diet and exercise. She will continue to monitor her blood pressure at home. One month follow up with fasted labs and physical.   - lisinopriL (PRINIVIL,ZESTRIL) 5 MG tablet; Take 1 tablet (5 mg total) by mouth daily.  Dispense: 30 tablet; Refill: 0  -Continue taking your blood pressure daily, goals are less than 140/90.  -Recheck labs in one month with BP follow up.  -Follow up with provider if having blurred or double vision, headaches, chest pain, or shortness of breath.    2. Impacted cerumen of left ear: Provider was able to remove via curette. TM was normal.        Patient Instructions   Continue to take your blood pressure one hour after your lisinopril. Record your readings, bring them with you to your follow up appointment.    It is normal for the left ear to be a little sore for the next 24 hours after removing the ear wax. Please let me know if you develop a fever, chills, or worsening ear pain.    I will plan on seeing you back in a month with fasted labs and a blood pressure recheck.    Start working on diet and exercise like we talked about today.   Patient Education     Eating Heart-Healthy Foods  Eating has a big impact on your heart health. In fact, eating healthier can improve several of your heart risks at once. For instance, it helps you manage weight, cholesterol, and blood pressure. Here are ideas to help you make heart-healthy changes without giving up all the foods and flavors you love.  Getting started    Talk with  your healthcare provider about eating plans, such as the DASH or Mediterranean diet. You may also be referred to a dietitian.    Change a few things at a time. Give yourself time to get used to a few eating changes before adding more.    Work to create a tasty, healthy eating plan that you can stick to for the rest of your life.    Goals for healthy eating  Below are some tips to improve your eating habits:    Limit saturated fats and trans fats. Saturated fats raise your levels of cholesterol, so keep these fats to a minimum. They are found in foods such as fatty meats, whole milk, cheese, and palm and coconut oils. Avoid trans fats because they lower good cholesterol as well as raise bad cholesterol. Trans fats are most often found in processed foods.    Reduce sodium (salt) intake. Eating too much salt may increase your blood pressure. Limit your sodium intake to 2,300 milligrams (mg) per day (the amount in 1 teaspoon of salt), or less if your healthcare provider recommends it. Dining out less often and eating fewer processed foods are two great ways to decrease the amount of salt you consume.    Managing calories. A calorie is a unit of energy. Your body burns calories for fuel, but if you eat more calories than your body burns, the extras are stored as fat. Your healthcare provider can help you create a diet plan to manage your calories. This will likely include eating healthier foods as well as exercising regularly. To help you track your progress, keep a diary to record what you eat and how often you exercise.  Choose the right foods  Aim to make these foods staples of your diet. If you have diabetes, you may have different recommendations than what is listed here:    Fruits and vegetables provide plenty of nutrients without a lot of calories. At meals, fill half your plate with these foods. Split the other half of your plate between whole grains and lean protein.    Whole grains are high in fiber and rich in  vitamins and nutrients. Good choices include whole-wheat bread, pasta, and brown rice.    Lean proteins give you nutrition with less fat. Good choices include fish, skinless chicken, and beans.    Low-fat or nonfat dairy provides nutrients without a lot of fat. Try low-fat or nonfat milk, cheese, or yogurt.    Healthy fats can be good for you in small amounts. These are unsaturated fats, such as olive oil, nuts, and fish. Try to have at least 2 servings per week of fatty fish, such as salmon, sardines, mackerel, rainbow trout, and albacore tuna. These contain omega-3 fatty acids, which are good for your heart. Flaxseed is another source of a heart-healthy fat.  More on heart-healthy eating  Read food labels  Healthy eating starts at the grocery store. Be sure to pay attention to food labels on packaged foods. Look for products that are high in fiber and protein, and low in saturated fat, cholesterol, and sodium. Avoid products that contain trans fat. And pay close attention to serving size. For instance, if you plan to eat two servings, double all the numbers on the label.  Prepare food right  A key part of healthy cooking is cutting down on added fat and salt. Look on the internet for lower-fat, lower-sodium recipes. Also, try these tips:    Remove fat from meat and skin from poultry before cooking.    Skim fat from the surface of soups and sauces.    Broil, boil, bake, steam, grill, and microwave food without added fats.    Choose ingredients that spice up your food without adding calories, fat, or sodium. Try these items: horseradish, hot sauce, lemon, mustard, nonfat salad dressings, and vinegar. For salt-free herbs and spices, try basil, cilantro, cinnamon, pepper, and rosemary.  Date Last Reviewed: 10/1/2017    9153-6089 [employer]. [address] [place] [address], [place] [address]. All rights reserved. This information is not intended as a substitute for professional medical care. Always follow your  healthcare professional's instructions.             Return in about 1 month (around 12/11/2020) for Routine preventive, fasted labs.         Subjective:      Zaina Cortez is a 65 y.o. female presents today for follow up of her blood pressure.    She also reports having a full sensation in her left ear. She reports that this started about one month ago. She denies any drainage from the ear, it just feels full. She denies a fever or chills. Hear hearing is muffled. She denies a sore throat. She has not tried anything at home to help with this.    She reports that her blood pressure at home has been running around 140's over 90's. She reports that she will be starting her work wellness program, starting to exercise and eat better soon.    She denies headaches, blurred or double vision. She denies any shortness of breath, chest pain, or chest pressure.     The following portions of the patient's history were reviewed and updated as appropriate.    Review of Systems:    Review is otherwise negative except for what is mentioned above.     Social Hx:    Social History     Tobacco Use   Smoking Status Never Smoker   Smokeless Tobacco Never Used         Objective:     Vitals:    11/11/20 1102   BP: 151/87   Pulse: 77   SpO2: 99%   Weight: (!) 276 lb (125.2 kg)       Exam:  General: No apparent distress. Calm. Alert and Oriented X3. Pt behavior is appropriate.  Head:Atraumatic. Normocephalic.  Neck: Supple. No adenopathy.  Eyes: PERRLA, No discharge. No strabismus. No nystagmus.  Ears: Left TM is occluded with cerumen, removed per provider with currette. Bilateral TMs pearly gray with landmarks visible.   Nose/Mouth/Throat: Patent nares, no oral lesions, pharynx clear and without exudate. Uvula mid-line. Nasal septum mid-line. Clear turbinates.   Lymph: cervical adenopathy.   Lungs: Clear to auscultation, normal respiratory effort and rate.   Heart: Regular rate and rhythm, no murmurs, gallops, or rubs. No edema.    Musculoskeletal: Walks without difficulty.   Neurologic: Interactive, alert, no focal findings.  Skin: Warm, dry. Normal skin turgor.       Patient Active Problem List   Diagnosis   ? Obesity   ? Sleep Apnea   ? Vitamin D Deficiency   ? Hypertension   ? TMJ (dislocation of temporomandibular joint)   ? RBBB   ? Elevated blood pressure, situational   ? Morbid obesity (H)     Current Outpatient Medications   Medication Sig Dispense Refill   ? lisinopriL (PRINIVIL,ZESTRIL) 5 MG tablet Take 1 tablet (5 mg total) by mouth daily. 30 tablet 0     No current facility-administered medications for this visit.        Shannan Oneill, Adult-Geriatric Nurse Practitioner  [facility] - Internal Medicine Team     11/11/2020

## 2021-07-04 NOTE — ADDENDUM NOTE
Addendum Note by Linsey Hood LPN at 2/19/2021  1:52 PM     Author: Linsey Hood LPN Service: -- Author Type: Licensed Nurse    Filed: 2/19/2021  4:29 PM Encounter Date: 2/19/2021 Status: Signed    : Linsey Hood LPN (Licensed Nurse)    Addended by: LINSEY HOOD on: 2/19/2021 04:29 PM        Modules accepted: Orders

## 2021-07-04 NOTE — ADDENDUM NOTE
Addendum Note by Lombardi, Susan L, RN at 3/15/2021 10:45 AM     Author: Lombardi, Susan L, RN Service: -- Author Type: RN, Care Manager    Filed: 3/15/2021 11:59 AM Date of Service: 3/15/2021 10:45 AM Status: Signed    : Lombardi, Susan L, RN (RN, Care Manager)    Encounter addended by: Lombardi, Susan L, RN on: 3/15/2021 11:59 AM      Actions taken: Clinical Note Signed, Charge Capture section accepted

## 2021-07-04 NOTE — ADDENDUM NOTE
Addendum Note by Lombardi, Susan L, RN at 2/8/2021  9:30 AM     Author: Lombardi, Susan L, RN Service: -- Author Type: RN, Care Manager    Filed: 2/8/2021 11:55 AM Date of Service: 2/8/2021  9:30 AM Status: Signed    : Lombardi, Susan L, RN (RN, Care Manager)    Encounter addended by: Lombardi, Susan L, RN on: 2/8/2021 11:55 AM      Actions taken: Clinical Note Signed, Charge Capture section accepted

## 2021-07-04 NOTE — ADDENDUM NOTE
Addendum Note by Julianna Bose CMA at 2/8/2021  9:30 AM     Author: Julianna Bose CMA Service: -- Author Type: Medical Assistant    Filed: 2/8/2021  4:32 PM Date of Service: 2/8/2021  9:30 AM Status: Signed    : Julianna Bose CMA (Medical Assistant)    Encounter addended by: Julianna Bose CMA on: 2/8/2021  4:32 PM      Actions taken: Order list changed, Diagnosis association updated

## 2021-07-05 PROBLEM — Z51.81 ENCOUNTER FOR MONITORING ANASTROZOLE THERAPY: Status: ACTIVE | Noted: 2021-06-27

## 2021-07-05 PROBLEM — Z79.811 ENCOUNTER FOR MONITORING ANASTROZOLE THERAPY: Status: ACTIVE | Noted: 2021-06-27

## 2021-07-06 ENCOUNTER — RECORDS - HEALTHEAST (OUTPATIENT)
Dept: ADMINISTRATIVE | Facility: OTHER | Age: 66
End: 2021-07-06

## 2021-07-06 ENCOUNTER — RECORDS - HEALTHEAST (OUTPATIENT)
Dept: BONE DENSITY | Facility: CLINIC | Age: 66
End: 2021-07-06

## 2021-07-06 VITALS
BODY MASS INDEX: 47.14 KG/M2 | WEIGHT: 283.3 LBS | HEART RATE: 83 BPM | OXYGEN SATURATION: 98 % | SYSTOLIC BLOOD PRESSURE: 159 MMHG | TEMPERATURE: 97.9 F | DIASTOLIC BLOOD PRESSURE: 82 MMHG

## 2021-07-06 VITALS
SYSTOLIC BLOOD PRESSURE: 135 MMHG | OXYGEN SATURATION: 98 % | TEMPERATURE: 97.5 F | WEIGHT: 284.4 LBS | BODY MASS INDEX: 47.33 KG/M2 | DIASTOLIC BLOOD PRESSURE: 71 MMHG | HEART RATE: 87 BPM

## 2021-07-06 DIAGNOSIS — Z78.0 ASYMPTOMATIC MENOPAUSAL STATE: ICD-10-CM

## 2021-07-22 NOTE — PROGRESS NOTES
"Oncology Rooming Note    06/24/21 10:23 AM    Zaina Cortez is a 65 y.o. female who presents for:    Chief Complaint   Patient presents with     HE Cancer     Breast Cancer       Initial Vitals: /71   Pulse 87   Temp 97.5  F (36.4  C) (Oral)   Wt (!) 284 lb 6.4 oz (129 kg)   SpO2 98%   BMI 47.33 kg/m       Estimated body mass index is 47.33 kg/m  as calculated from the following:    Height as of 3/24/21: 5' 5\" (1.651 m).    Weight as of this encounter: 284 lb 6.4 oz (129 kg).     Body surface area is 2.43 meters squared.      Allergies reviewed: Yes  Medications reviewed: Yes    Refills needed: No    Pharmacy name entered into EPIC: @PrefferedPHARMACY@      Clinical concerns: concerns - intermittent left breast pain. Denies now.      Ruby Koo RN      "

## 2021-07-22 NOTE — PROGRESS NOTES
[facility] Hematology and Oncology Progress Note    Patient: Zaina Cortez  MRN: [id number]  Date of Service: 06/24/2021        Reason for Visit    Chief Complaint   Patient presents with     HE Cancer     Breast Cancer       Assessment and Plan  Cancer Staging  Neoplasm of left breast, primary tumor staging category Tis: ductal carcinoma in situ (DCIS)  Staging form: Breast, AJCC 8th Edition  - Clinical: No stage assigned - Unsigned  - Pathologic stage from 3/24/2021: Stage Unknown (pTis (DCIS), pNX, cM0, G3, ER+, IN-, HER2: Not Assessed) - Signed by Yasmany Alvarez MD on 3/24/2021      ECOG Performance    1    Distress Assessment  Distress Assessment Score: 7(family stress)    Pain  Currently in Pain: No/denies    #.  DCIS of the upper outer quadrant of the left breast, ER positive (greater than 95%), IN focal positive (5%)  #.  Postmenopausal     Clinically well. No clinical signs and symptoms suggestive of breast cancer recurrence.  She shared that she found someone she had the experience of metastatic breast cancer after she was diagnosed with DCIS.  I am not quite sure how this occurred, however I explained to her that it will be very very unusual to see has metastatic disease after diagnosed with DCIS.  We talked about the physiology of DCIS, and statistics on breast cancer recurrence and survival.  She voiced understanding.  I advised her to read information from cancer.gov or NCCN.org     She has manageable side effects from anastrozole.  She is comfortable continuing it.  We will plan to complete 5 years of therapy (till around April 2026)   Follow-up clinical exam in about 6 months.    #.  Bone health.   She has not completed a bone density scan yet.  We will schedule the bone density scan.    She will continue with calcium, vitamin D, weightbearing exercises.    #.  Overweight/obesity   Discussed about the importance of weight loss and breast cancer survivors.    Problem List    1. Breast neoplasm,  Tis (DCIS), left  anastrozole (ARIMIDEX) 1 mg tablet   2. Morbid obesity (H)     3. Encounter for monitoring anastrozole therapy          CC: Hortencia Banuelos, NP    ______________________________________________________________________________  Diagnosis  2/26/2021-left breast DCIS by screening mammogram.     -High-grade, solid, comedo with focal comedonecrosis.  37 mm.  Margins were negative.   -ER positive (greater than 95%, strong)   -SC focal positive (5%, weak to moderate)    Treatment to date  2/26/2021-underwent left breast lumpectomy.  4/6/2021-5/3/2021-completed adjuvant radiation of the left breast 5240 cGy in 20 treatments by Dr. Purcell.  4/2021-adjuvant endocrine therapy with anastrozole.    History of Present Illness    Ms. Zaina Cortez presented herself today.  She reported intermittent left breast pain.  Currently no pain.  She has some hot flashes but not drenching sweats.  She noted hot flashes about 3-4 times a day and overall manageable.  She takes anastrozole regularly and tolerating it very well.    Pain Status  Currently in Pain: No/denies    Review of systems.  Apart from describing in HPI, the remainder of comprehensive ROS was negative.      Past History  Past Medical History:   Diagnosis Date     Hypertension      Sleep apnea     Created by Conversion  Replacement Utility updated for latest IMO load, Before Gastrectomy Used a CPAP no long uses         Past Surgical History:   Procedure Laterality Date     MA STEREOTACTIC BREAST BIOPSY VACUUM L Left 2/1/2021     SC MASTECTOMY, PARTIAL Left 2/26/2021    Procedure: LEFT WIRE LOCALIZED LUMPECTOMY;  Surgeon: Joslyn Marie DO;  Location: [facility];  Service: General     SC REMOVAL GALLBLADDER      Description: Cholecystectomy;  Recorded: 06/01/2012;  Comments: 04/2008 PAULINA Maxwell     SC REMV STOMACH,PART,DISTAL,GASTRODUOD      Description: Partial Gastrectomy;  Recorded: 12/23/2013;  Comments: Sleeve Gastrectomy for Bariatric Purposes.        Physical Exam    Recent Vitals 6/24/2021   Weight 284 lbs 6 oz   BSA (m2) 2.43 m2   /71   Pulse 87   Temp 97.5   Temp src 1   SpO2 98   Some recent data might be hidden     General: alert, awake, not in acute distress  HEENT: Head: Normal, normocephalic, atraumatic.  Eye: Normal external eye, conjunctiva, lids cornea, [place].  Nose: Normal external nose, mucus membranes and septum.  Pharynx: Normal buccal mucosa. Normal pharynx.  Neck / Thyroid: Supple, no masses, nodes, nodules or enlargement.  Lymphatics: No abnormally enlarged lymph nodes.  Chest: Normal chest wall and respirations. Clear to auscultation.  Breasts: Bilateral pendulous breasts.  No palpable masses although very difficult exam due to the size and glandular breast tissue.  Heart: S1 S2 RRR, no murmur.   Abdomen: abdomen is soft without significant tenderness, masses, organomegaly or guarding  Extremities: normal strength, tone, and muscle mass  Skin: normal. no rash or abnormalities  CNS: non focal.    Lab Results    No results found for this or any previous visit (from the past 168 hour(s)).    Imaging    No results found.    TT: 30 minutes: time consisted of preparing to see the patient (eg. review of tests)- 5minutes, obtaining an/or reviewing separately obtained history (20 minutes), ordering medication, test , communicating with other healthcare professionals, or documenting clinical information in the electronic health record (5 minutes).    Signed by: Yasmany Alvarez MD

## 2021-10-16 ENCOUNTER — HEALTH MAINTENANCE LETTER (OUTPATIENT)
Age: 66
End: 2021-10-16

## 2021-11-23 ENCOUNTER — IMMUNIZATION (OUTPATIENT)
Dept: NURSING | Facility: CLINIC | Age: 66
End: 2021-11-23
Payer: COMMERCIAL

## 2021-11-23 PROCEDURE — 0004A PR COVID VAC PFIZER DIL RECON 30 MCG/0.3 ML IM: CPT

## 2021-11-23 PROCEDURE — 91300 PR COVID VAC PFIZER DIL RECON 30 MCG/0.3 ML IM: CPT

## 2021-12-30 DIAGNOSIS — D05.12 INTRADUCTAL CARCINOMA IN SITU OF LEFT BREAST: ICD-10-CM

## 2021-12-30 RX ORDER — ANASTROZOLE 1 MG/1
TABLET ORAL
Qty: 90 TABLET | Refills: 1 | Status: SHIPPED | OUTPATIENT
Start: 2021-12-30 | End: 2022-06-21

## 2021-12-30 NOTE — TELEPHONE ENCOUNTER
Refill request received via [employer] from [facility] for anastrozole, last filled  By Dr. Alvarez on 6/24/2021, 90 tablets, 1 refill/Emily Bello RN

## 2022-01-01 NOTE — ANESTHESIA PREPROCEDURE EVALUATION
Anesthesia Evaluation      Patient summary reviewed     Airway   Mallampati: II  Neck ROM: full   Pulmonary - normal exam   (+) sleep apnea,                          Cardiovascular - normal exam  (+) hypertension, ,      Neuro/Psych - negative ROS     Endo/Other    (+) obesity (BMI > 45),      GI/Hepatic/Renal - negative ROS           Dental - normal exam                        Anesthesia Plan  Planned anesthetic: general endotracheal    COVID neg 2/22    GETA  TIVA with propofol  Toradol if OK with surgeon  Fentanyl prn  Decadron, zofran   ASA 3   Induction: intravenous   Anesthetic plan and risks discussed with: patient    Post-op plan: routine recovery          
Deferred to Pediatrician's Office

## 2022-01-25 ENCOUNTER — ONCOLOGY VISIT (OUTPATIENT)
Dept: ONCOLOGY | Facility: CLINIC | Age: 67
End: 2022-01-25
Payer: COMMERCIAL

## 2022-01-25 VITALS
BODY MASS INDEX: 47.65 KG/M2 | DIASTOLIC BLOOD PRESSURE: 92 MMHG | SYSTOLIC BLOOD PRESSURE: 142 MMHG | WEIGHT: 286 LBS | OXYGEN SATURATION: 97 % | HEART RATE: 83 BPM | HEIGHT: 65 IN

## 2022-01-25 DIAGNOSIS — Z12.31 VISIT FOR SCREENING MAMMOGRAM: Primary | ICD-10-CM

## 2022-01-25 PROCEDURE — 99214 OFFICE O/P EST MOD 30 MIN: CPT | Performed by: INTERNAL MEDICINE

## 2022-01-25 PROCEDURE — G0463 HOSPITAL OUTPT CLINIC VISIT: HCPCS

## 2022-01-25 ASSESSMENT — MIFFLIN-ST. JEOR: SCORE: 1848.17

## 2022-01-25 ASSESSMENT — PAIN SCALES - GENERAL: PAINLEVEL: NO PAIN (0)

## 2022-01-25 NOTE — PROGRESS NOTES
[facility] Hematology and Oncology Progress Note    Patient: Zaina Cortez  MRN: [id number]  Date of Service: Jan 25, 2022         Reason for Visit    Chief Complaint   Patient presents with     Oncology Clinic Visit     Breast Cancer       Assessment and Plan    Cancer Staging  No matching staging information was found for the patient.    ECOG Performance    0 - Independent     Pain  Pain Score: No Pain (0)    #.  DCIS of the upper outer quadrant of the left breast, ER positive (greater than 95%), [place] focal positive (5%)  #.  Postmenopausal     Clinically well. Exam is unremarkable.    Continue anastrozole. We will plan to complete 5 years of therapy (till around April 2026).    Mammogram requested to schedule end of February or early March 2022.    Follow-up clinical exam in 6 months.     #.  Bone health.   Reviewed the bone density scan completed from 7/2021. It showed normal bone density. Discussed about calcium and vitamin D supplements, weightbearing exercises and the importance to continue to maintain bone density while she is on aromatase inhibitor therapy.    We will plan to obtain next DEXA scan in 2023.     #.  Overweight/obesity   Discussed about the importance of weight loss and breast cancer survivors.      Encounter Diagnoses:    Problem List Items Addressed This Visit     None      Visit Diagnoses     Visit for screening mammogram    -  Primary    Relevant Orders    MA Screening Digital Bilateral             CC: Hortencia Banuelos NP   ______________________________________________________________________________  Diagnosis  2/26/2021-left breast DCIS by screening mammogram.     -High-grade, solid, comedo with focal comedonecrosis.  37 mm.  Margins were negative.   -ER positive (greater than 95%, strong)   -MO focal positive (5%, weak to moderate)    Treatment to date  2/26/2021-underwent left breast lumpectomy. (Dr. Marie)  4/6/2021-5/3/2021-completed adjuvant radiation of the left breast 5240 cGy  "in 20 treatments (Dr. Purcell).  4/2021-adjuvant endocrine therapy with anastrozole.    History of Present Illness    Ms. Zaina Cortez presented herself today.    She is doing well. She tolerates anastrozole very well with manageable side effects of hot flashes intermittently.   She has gained weight. Now she is retired that she is planning to get more activity and lose weight. She takes anastrozole regularly. She takes calcium sometimes but she is very good at taking vitamin D. No other concerns.     Review of systems  Apart from describing in HPI, the remainder of comprehensive ROS was negative.    Past History    Past Medical History:   Diagnosis Date     Hypertension      Sleep apnea     Created by Conversion  Replacement Utility updated for latest IMO load, Before Gastrectomy Used a CPAP no long uses       Past Surgical History:   Procedure Laterality Date     HC REMOVAL GALLBLADDER      Description: Cholecystectomy;  Recorded: 06/01/2012;  Comments: 04/2008 PAULINA Maxwell MA STEREOTACTIC BREAST BIOPSY VACUUM L Left 2/1/2021     NM MASTECTOMY, PARTIAL Left 2/26/2021    Procedure: LEFT WIRE LOCALIZED LUMPECTOMY;  Surgeon: Joslyn Marie DO;  Location: [facility];  Service: General     [facility] REMV STOMACH,PART,DISTAL,GASTRODUOD      Description: Partial Gastrectomy;  Recorded: 12/23/2013;  Comments: Sleeve Gastrectomy for Bariatric Purposes.         Physical Exam    BP (!) 142/92 (BP Location: Left arm, Patient Position: Sitting, Cuff Size: Adult Large)   Pulse 83   Ht 1.651 m (5' 5\")   Wt 129.7 kg (286 lb)   SpO2 97%   BMI 47.59 kg/m        General: alert, awake, not in acute distress  HEENT: Head: Normal, normocephalic, atraumatic.  Eye: Normal external eye, conjunctiva, lids cornea, [place].  Nose: Normal external nose, mucus membranes and septum.  Pharynx: Normal buccal mucosa. Normal pharynx.  Neck / Thyroid: Supple, no masses, nodes, nodules or enlargement.  Lymphatics: No abnormally enlarged lymph " nodes.  Chest: Normal chest wall and respirations. Clear to auscultation.  Breasts: Bilateral pendulous breasts. No palpable discrete masses. She has induration at the post lumpectomy site in the left breast  Abdomen: abdomen is soft without significant tenderness, masses, organomegaly or guarding  Extremities: normal strength, tone, and muscle mass  Skin: normal. no rash or abnormalities  CNS: non focal.    Lab Results    No results found for this or any previous visit (from the past 168 hour(s)).    Imaging    No results found.    I spent 30 minutes on the date of service, of which greater than 50% of the time was spent on counseling and coordination of care.    Signed by: Yasmany Alvarez MD

## 2022-01-25 NOTE — LETTER
"    1/25/2022         RE: Zaina Cortez  [address]  [facility] [place] [address]        Dear Colleague,    Thank you for referring your patient, Zaina Cortez, to the [facility]. Please see a copy of my visit note below.    Oncology Rooming Note    January 25, 2022 10:31 AM   Zaina Cortez is a 66 year old female who presents for:    Chief Complaint   Patient presents with     Oncology Clinic Visit     Breast Cancer     Initial Vitals: BP (!) 142/92 (BP Location: Left arm, Patient Position: Sitting, Cuff Size: Adult Large)   Pulse 83   Ht 1.651 m (5' 5\")   Wt 129.7 kg (286 lb)   SpO2 97%   BMI 47.59 kg/m   Estimated body mass index is 47.59 kg/m  as calculated from the following:    Height as of this encounter: 1.651 m (5' 5\").    Weight as of this encounter: 129.7 kg (286 lb). Body surface area is 2.44 meters squared.  No Pain (0) Comment: Data Unavailable   No LMP recorded.  Allergies reviewed: Yes  Medications reviewed: Yes    Medications: Medication refills not needed today.  Pharmacy name entered into [employer]: [id number] [place], [place] - [address]    Clinical concerns: 6 month     Nolvia Low              [facility] Hematology and Oncology Progress Note    Patient: Zaina Cortez  MRN: [id number]  Date of Service: Jan 25, 2022         Reason for Visit    Chief Complaint   Patient presents with     Oncology Clinic Visit     Breast Cancer       Assessment and Plan    Cancer Staging  No matching staging information was found for the patient.    ECOG Performance    0 - Independent     Pain  Pain Score: No Pain (0)    #.  DCIS of the upper outer quadrant of the left breast, ER positive (greater than 95%), MA focal positive (5%)  #.  Postmenopausal     Clinically well. Exam is unremarkable.    Continue anastrozole. We will plan to complete 5 years of therapy (till around April 2026).    Mammogram requested to schedule end of February or early March 2022. "    Follow-up clinical exam in 6 months.     #.  Bone health.   Reviewed the bone density scan completed from 7/2021. It showed normal bone density. Discussed about calcium and vitamin D supplements, weightbearing exercises and the importance to continue to maintain bone density while she is on aromatase inhibitor therapy.    We will plan to obtain next DEXA scan in 2023.     #.  Overweight/obesity   Discussed about the importance of weight loss and breast cancer survivors.      Encounter Diagnoses:    Problem List Items Addressed This Visit     None      Visit Diagnoses     Visit for screening mammogram    -  Primary    Relevant Orders    MA Screening Digital Bilateral             CC: Hortencia Banuelos, NP   ______________________________________________________________________________  Diagnosis  2/26/2021-left breast DCIS by screening mammogram.     -High-grade, solid, comedo with focal comedonecrosis.  37 mm.  Margins were negative.   -ER positive (greater than 95%, strong)   -ND focal positive (5%, weak to moderate)    Treatment to date  2/26/2021-underwent left breast lumpectomy. (Dr. Marie)  4/6/2021-5/3/2021-completed adjuvant radiation of the left breast 5240 cGy in 20 treatments (Dr. Purcell).  4/2021-adjuvant endocrine therapy with anastrozole.    History of Present Illness    Ms. Zaina Cortez presented herself today.    She is doing well. She tolerates anastrozole very well with manageable side effects of hot flashes intermittently.   She has gained weight. Now she is retired that she is planning to get more activity and lose weight. She takes anastrozole regularly. She takes calcium sometimes but she is very good at taking vitamin D. No other concerns.     Review of systems  Apart from describing in HPI, the remainder of comprehensive ROS was negative.    Past History    Past Medical History:   Diagnosis Date     Hypertension      Sleep apnea     Created by Conversion  Replacement Utility updated for latest  "IMO load, Before Gastrectomy Used a CPAP no long uses       Past Surgical History:   Procedure Laterality Date     HC REMOVAL GALLBLADDER      Description: Cholecystectomy;  Recorded: 06/01/2012;  Comments: 04/2008 PAULINA Maxwell MA STEREOTACTIC BREAST BIOPSY VACUUM L Left 2/1/2021     UT MASTECTOMY, PARTIAL Left 2/26/2021    Procedure: LEFT WIRE LOCALIZED LUMPECTOMY;  Surgeon: Joslyn Marie DO;  Location: [facility];  Service: General     [facility] REMV STOMACH,PART,DISTAL,GASTRODUOD      Description: Partial Gastrectomy;  Recorded: 12/23/2013;  Comments: Sleeve Gastrectomy for Bariatric Purposes.         Physical Exam    BP (!) 142/92 (BP Location: Left arm, Patient Position: Sitting, Cuff Size: Adult Large)   Pulse 83   Ht 1.651 m (5' 5\")   Wt 129.7 kg (286 lb)   SpO2 97%   BMI 47.59 kg/m        General: alert, awake, not in acute distress  HEENT: Head: Normal, normocephalic, atraumatic.  Eye: Normal external eye, conjunctiva, lids cornea, TR[place]ON.  Nose: Normal external nose, mucus membranes and septum.  Pharynx: Normal buccal mucosa. Normal pharynx.  Neck / Thyroid: Supple, no masses, nodes, nodules or enlargement.  Lymphatics: No abnormally enlarged lymph nodes.  Chest: Normal chest wall and respirations. Clear to auscultation.  Breasts: Bilateral pendulous breasts. No palpable discrete masses. She has induration at the post lumpectomy site in the left breast  Abdomen: abdomen is soft without significant tenderness, masses, organomegaly or guarding  Extremities: normal strength, tone, and muscle mass  Skin: normal. no rash or abnormalities  CNS: non focal.    Lab Results    No results found for this or any previous visit (from the past 168 hour(s)).    Imaging    No results found.    I spent 30 minutes on the date of service, of which greater than 50% of the time was spent on counseling and coordination of care.    Signed by: Yasmany Alvarez MD        Again, thank you for allowing me to participate in " the care of your patient.        Sincerely,        Yasmany Alvarez MD

## 2022-01-25 NOTE — PROGRESS NOTES
"Oncology Rooming Note    January 25, 2022 10:31 AM   Zaina Cortez is a 66 year old female who presents for:    Chief Complaint   Patient presents with     Oncology Clinic Visit     Breast Cancer     Initial Vitals: BP (!) 142/92 (BP Location: Left arm, Patient Position: Sitting, Cuff Size: Adult Large)   Pulse 83   Ht 1.651 m (5' 5\")   Wt 129.7 kg (286 lb)   SpO2 97%   BMI 47.59 kg/m   Estimated body mass index is 47.59 kg/m  as calculated from the following:    Height as of this encounter: 1.651 m (5' 5\").    Weight as of this encounter: 129.7 kg (286 lb). Body surface area is 2.44 meters squared.  No Pain (0) Comment: Data Unavailable   No LMP recorded.  Allergies reviewed: Yes  Medications reviewed: Yes    Medications: Medication refills not needed today.  Pharmacy name entered into [employer]: [id number] [place], [place] - [address]    Clinical concerns: 6 month     Nolvia Low            "

## 2022-03-01 ENCOUNTER — HOSPITAL ENCOUNTER (OUTPATIENT)
Dept: MAMMOGRAPHY | Facility: CLINIC | Age: 67
Discharge: HOME | End: 2022-03-01
Attending: INTERNAL MEDICINE | Admitting: INTERNAL MEDICINE
Payer: COMMERCIAL

## 2022-03-01 DIAGNOSIS — Z12.31 VISIT FOR SCREENING MAMMOGRAM: ICD-10-CM

## 2022-03-01 PROCEDURE — 77067 SCR MAMMO BI INCL CAD: CPT

## 2022-05-05 ENCOUNTER — OFFICE VISIT (OUTPATIENT)
Dept: FAMILY MEDICINE | Facility: CLINIC | Age: 67
End: 2022-05-05
Payer: COMMERCIAL

## 2022-05-05 VITALS
BODY MASS INDEX: 48.03 KG/M2 | HEIGHT: 65 IN | SYSTOLIC BLOOD PRESSURE: 112 MMHG | OXYGEN SATURATION: 98 % | DIASTOLIC BLOOD PRESSURE: 80 MMHG | WEIGHT: 288.3 LBS | HEART RATE: 79 BPM

## 2022-05-05 DIAGNOSIS — Z13.220 SCREENING FOR LIPID DISORDERS: ICD-10-CM

## 2022-05-05 DIAGNOSIS — Z00.00 ROUTINE GENERAL MEDICAL EXAMINATION AT A HEALTH CARE FACILITY: Primary | ICD-10-CM

## 2022-05-05 DIAGNOSIS — I10 ESSENTIAL HYPERTENSION: ICD-10-CM

## 2022-05-05 DIAGNOSIS — M65.4 DE QUERVAIN'S DISEASE (TENOSYNOVITIS): ICD-10-CM

## 2022-05-05 DIAGNOSIS — Z12.4 CERVICAL CANCER SCREENING: ICD-10-CM

## 2022-05-05 DIAGNOSIS — Z12.11 SCREEN FOR COLON CANCER: ICD-10-CM

## 2022-05-05 PROCEDURE — G0123 SCREEN CERV/VAG THIN LAYER: HCPCS | Performed by: NURSE PRACTITIONER

## 2022-05-05 PROCEDURE — 0054A COVID-19,PF,PFIZER (12+ YRS): CPT | Performed by: NURSE PRACTITIONER

## 2022-05-05 PROCEDURE — 91305 COVID-19,PF,PFIZER (12+ YRS): CPT | Performed by: NURSE PRACTITIONER

## 2022-05-05 PROCEDURE — 87624 HPV HI-RISK TYP POOLED RSLT: CPT | Performed by: NURSE PRACTITIONER

## 2022-05-05 PROCEDURE — 99396 PREV VISIT EST AGE 40-64: CPT | Mod: 25 | Performed by: NURSE PRACTITIONER

## 2022-05-05 RX ORDER — ANASTROZOLE 1 MG/1
1 TABLET ORAL DAILY
Qty: 30 TABLET | Refills: 1 | Status: CANCELLED | OUTPATIENT
Start: 2022-05-05

## 2022-05-05 RX ORDER — LISINOPRIL 5 MG/1
5 TABLET ORAL DAILY
Qty: 90 TABLET | Refills: 3 | Status: SHIPPED | OUTPATIENT
Start: 2022-05-05 | End: 2023-04-24

## 2022-05-05 NOTE — PROGRESS NOTES
SUBJECTIVE:   CC: Zaina Cortez is an 66 year old woman who presents for preventive health visit.     Patient is now retired.  She is working as a  in an assisted living facility part-time.  She does enjoy this.  Her brother passed away in December, which has been difficult.    History of breast cancer.  She is on anastrozole for another 4 years.    Patient is postmenopausal.  She is due for a Pap smear.  She denies any vaginal bleeding.  Believes she has had an abnormal Pap in the past.  It appears that her last Pap showed ASCUS.  No history of HPV that she is aware of.    Patient complains of left wrist pain for the past 2 to 3 weeks.  She does tend to hold her phone with her left hand.  She is right-hand dominant.  No pain or swelling in the fingers.  Pain is worse with any twisting movements like opening jars.  Denies any numbness or tingling in the hand.  She has gotten a splint for the wrist.    Patient has been advised of split billing requirements and indicates understanding: Yes  Healthy Habits:     Getting at least 3 servings of Calcium per day:  NO    Bi-annual eye exam:  Yes    Dental care twice a year:  Yes    Sleep apnea or symptoms of sleep apnea:  None    Diet:  Regular (no restrictions)    Frequency of exercise:  None    Taking medications regularly:  Yes    Medication side effects:  None    PHQ-2 Total Score: 2    Additional concerns today:  Yes        Today's PHQ-2 Score:   PHQ-2 ( 1999 [employer]) 5/5/2022   Q1: Little interest or pleasure in doing things 1   Q2: Feeling down, depressed or hopeless 1   PHQ-2 Score 2   Q1: Little interest or pleasure in doing things Several days   Q2: Feeling down, depressed or hopeless Several days   PHQ-2 Score 2       Abuse: Current or Past (Physical, Sexual or Emotional) - No  Do you feel safe in your environment? Yes    Have you ever done Advance Care Planning? (For example, a Health Directive, POLST, or a discussion with a medical provider or your  "loved ones about your wishes): No, advance care planning information given to patient to review.  Patient declined advance care planning discussion at this time.    Social History     Tobacco Use     Smoking status: Never Smoker     Smokeless tobacco: Never Used   Substance Use Topics     Alcohol use: Yes     Alcohol/week: 0.0 standard drinks     Comment: Alcoholic Drinks/day: 2 drinks per week       Alcohol Use 5/5/2022   Prescreen: >3 drinks/day or >7 drinks/week? No       Reviewed orders with patient.  Reviewed health maintenance and updated orders accordingly - Yes      Breast Cancer Screening:    Breast CA Risk Assessment (FHS-7) 5/5/2022   Do you have a family history of breast, colon, or ovarian cancer? No / Unknown       Pertinent mammograms are reviewed under the imaging tab.    History of abnormal Pap smear: YES - updated in Problem List and Health Maintenance accordingly  PAP / HPV Latest Ref Rng & Units 5/5/2022 12/7/2015   PAP - - Atypical squamous cells of undetermined significance  Electronically signed by Augie Chaudhry MD PhD on 12/15/2015 at  9:53 AM     HPV16 Negative Positive(A) -   HPV18 Negative Negative -   HRHPV Negative Negative -     Reviewed and updated as needed this visit by clinical staff   Tobacco  Allergies  Meds  Problems  Med Hx  Surg Hx  Fam Hx            Reviewed and updated as needed this visit by Provider   Tobacco  Allergies  Meds  Problems  Med Hx  Surg Hx  Fam Hx               Review of Systems  Pertinent items in HPI     OBJECTIVE:   /80   Pulse 79   Ht 1.651 m (5' 5\")   Wt 130.8 kg (288 lb 4.8 oz)   SpO2 98%   BMI 47.98 kg/m    Physical Exam  GENERAL: healthy, alert and no distress  EYES: Eyes grossly normal to inspection, PERRL and conjunctivae and sclerae normal  HENT: ear canals and TM's normal, nose and mouth without ulcers or lesions  NECK: no adenopathy, no asymmetry, masses, or scars and thyroid normal to palpation  RESP: lungs clear to " auscultation - no rales, rhonchi or wheezes  BREAST: normal without masses, tenderness or nipple discharge and no palpable axillary masses or adenopathy  CV: regular rate and rhythm, normal S1 S2, no S3 or S4, no murmur, click or rub, no peripheral edema  ABDOMEN: soft, nontender, no hepatosplenomegaly, no masses and bowel sounds normal   (female): normal female external genitalia, normal urethral meatus, vaginal mucosa pink, moist, well rugated, and normal cervix/adnexa/uterus without masses or discharge  MS: no gross musculoskeletal defects noted, no edema.  Pain palpated at the base of the left MCP joint.   strength equal bilaterally.  Finkelstein's test positive.  SKIN: no suspicious lesions or rashes  NEURO: Normal strength and tone, mentation intact and speech normal  PSYCH: mentation appears normal, affect normal/bright      ASSESSMENT/PLAN:   Zaina was seen today for physical.    Diagnoses and all orders for this visit:    Routine general medical examination at a health care facility    Essential hypertension  Well-controlled.  Continue lisinopril at current dose.  -     lisinopril (ZESTRIL) 5 MG tablet; Take 1 tablet (5 mg) by mouth daily  -     Comprehensive metabolic panel (BMP + Alb, Alk Phos, ALT, AST, Total. Bili, TP); Future    Screen for colon cancer  -     Adult Gastro Ref - Procedure Only; Future    Cervical cancer screening  -     Pap Screen with HPV - recommended age 30 - 65 years  -     HPV High Risk Types DNA Cervical    Screening for lipid disorders  -     Lipid Profile (Chol, Trig, HDL, LDL calc); Future    De Quervain's disease (tenosynovitis)  Suspect tendinitis.  Could also consider arthritis.  I encouraged patient to start using her right hand with her home, or laying it on a table or desk when using.  She can consider a spica thumb splint to allow for rest of the joint.    Other orders  -     REVIEW OF HEALTH MAINTENANCE PROTOCOL ORDERS  -     COVID-19,PF,PFIZER (12+ Yrs GRAY  "LABEL)          COUNSELING:  Reviewed preventive health counseling, as reflected in patient instructions    Estimated body mass index is 47.98 kg/m  as calculated from the following:    Height as of this encounter: 1.651 m (5' 5\").    Weight as of this encounter: 130.8 kg (288 lb 4.8 oz).    Weight management plan: Discussed healthy diet and exercise guidelines    She reports that she has never smoked. She has never used smokeless tobacco.      Hortencia Banuelos NP  [facility]  "

## 2022-05-09 LAB
HUMAN PAPILLOMA VIRUS 16 DNA: POSITIVE
HUMAN PAPILLOMA VIRUS 18 DNA: NEGATIVE
HUMAN PAPILLOMA VIRUS FINAL DIAGNOSIS: ABNORMAL
HUMAN PAPILLOMA VIRUS OTHER HR: NEGATIVE

## 2022-05-11 ENCOUNTER — PATIENT OUTREACH (OUTPATIENT)
Dept: FAMILY MEDICINE | Facility: CLINIC | Age: 67
End: 2022-05-11

## 2022-05-11 ENCOUNTER — LAB (OUTPATIENT)
Dept: LAB | Facility: CLINIC | Age: 67
End: 2022-05-11
Payer: COMMERCIAL

## 2022-05-11 DIAGNOSIS — Z13.220 SCREENING FOR LIPID DISORDERS: ICD-10-CM

## 2022-05-11 DIAGNOSIS — I10 ESSENTIAL HYPERTENSION: ICD-10-CM

## 2022-05-11 LAB
ALBUMIN SERPL-MCNC: 3.5 G/DL (ref 3.5–5)
ALP SERPL-CCNC: 85 U/L (ref 45–120)
ALT SERPL W P-5'-P-CCNC: 19 U/L (ref 0–45)
ANION GAP SERPL CALCULATED.3IONS-SCNC: 11 MMOL/L (ref 5–18)
AST SERPL W P-5'-P-CCNC: 20 U/L (ref 0–40)
BILIRUB SERPL-MCNC: 0.8 MG/DL (ref 0–1)
BKR LAB AP GYN ADEQUACY: NORMAL
BKR LAB AP GYN INTERPRETATION: NORMAL
BKR LAB AP HPV REFLEX: NORMAL
BKR LAB AP PREVIOUS ABNORMAL: NORMAL
BUN SERPL-MCNC: 16 MG/DL (ref 8–22)
CALCIUM SERPL-MCNC: 9.3 MG/DL (ref 8.5–10.5)
CHLORIDE BLD-SCNC: 106 MMOL/L (ref 98–107)
CHOLEST SERPL-MCNC: 212 MG/DL
CO2 SERPL-SCNC: 24 MMOL/L (ref 22–31)
CREAT SERPL-MCNC: 1.02 MG/DL (ref 0.6–1.1)
FASTING STATUS PATIENT QL REPORTED: YES
GFR SERPL CREATININE-BSD FRML MDRD: 61 ML/MIN/1.73M2
GLUCOSE BLD-MCNC: 91 MG/DL (ref 70–125)
HDLC SERPL-MCNC: 55 MG/DL
LDLC SERPL CALC-MCNC: 129 MG/DL
PATH REPORT.COMMENTS IMP SPEC: NORMAL
PATH REPORT.COMMENTS IMP SPEC: NORMAL
PATH REPORT.RELEVANT HX SPEC: NORMAL
POTASSIUM BLD-SCNC: 5.3 MMOL/L (ref 3.5–5)
PROT SERPL-MCNC: 6.6 G/DL (ref 6–8)
SODIUM SERPL-SCNC: 141 MMOL/L (ref 136–145)
TRIGL SERPL-MCNC: 142 MG/DL

## 2022-05-11 PROCEDURE — 36415 COLL VENOUS BLD VENIPUNCTURE: CPT

## 2022-05-11 PROCEDURE — 80061 LIPID PANEL: CPT

## 2022-05-11 PROCEDURE — 80053 COMPREHEN METABOLIC PANEL: CPT

## 2022-06-21 DIAGNOSIS — D05.12 INTRADUCTAL CARCINOMA IN SITU OF LEFT BREAST: ICD-10-CM

## 2022-06-21 RX ORDER — ANASTROZOLE 1 MG/1
TABLET ORAL
Qty: 90 TABLET | Refills: 1 | Status: SHIPPED | OUTPATIENT
Start: 2022-06-21 | End: 2022-12-14

## 2022-06-21 NOTE — TELEPHONE ENCOUNTER
[facility]: Cancer Care                                                                                          Refill request received from [employer] for anastrozole.  Last refilled by Dr. Alvarez on 12/30/21.  Quantity #90. 1 refill.    Patient last seen by Dr. Alvarez on 1/25/22.  Nex appointment with Dr. Alvarez on 7/26/22.    Message sent to Dr. Alvarez,    Signature:  Ruby Koo RN

## 2022-07-14 ENCOUNTER — OFFICE VISIT (OUTPATIENT)
Dept: OBGYN | Facility: CLINIC | Age: 67
End: 2022-07-14
Payer: MEDICARE

## 2022-07-14 VITALS
HEART RATE: 88 BPM | OXYGEN SATURATION: 97 % | WEIGHT: 287 LBS | BODY MASS INDEX: 47.76 KG/M2 | SYSTOLIC BLOOD PRESSURE: 118 MMHG | DIASTOLIC BLOOD PRESSURE: 78 MMHG

## 2022-07-14 DIAGNOSIS — R87.810 CERVICAL HIGH RISK HUMAN PAPILLOMAVIRUS (HPV) DNA TEST POSITIVE: Primary | ICD-10-CM

## 2022-07-14 PROCEDURE — 57452 EXAM OF CERVIX W/SCOPE: CPT | Performed by: OBSTETRICS & GYNECOLOGY

## 2022-07-14 NOTE — PROGRESS NOTES
Colposcopy Procedure Note    Indications: Pap smear on 5/5/22 showed NILM with type 16 HRHPV.  Pertinent past history includes ASCUS with negative HPV on 12/7/15.  Paps otherwise have been normal.    Procedure Details   /78 (BP Location: Right arm, Patient Position: Sitting, Cuff Size: Adult Regular)   Pulse 88   Wt 130.2 kg (287 lb)   Body mass index is 47.76 kg/m .    The reason for procedure is explained, and informed consent is obtained.      Speculum is placed in the vagina and visualization of cervix is achieved. The cervix is swabbed with 3% acetic acid solution. Transformation zone is easily seen.  Green filter is applied with no abnormal vessels seen.  Acetowhite epithelium is not seen.  ECC is not done.  Adequate colposcopy.  The patient tolerated the procedure well.    Impression: normal cervix    Plan: Post procedure instructions are reviewed.  The role of HPV in causing cervical pap smear abnormalities, dysplasia, and cancer is reviewed with the patient.  We discussed the role of the immune system and ways to keep it strong.  She was counseled to return to Hortencia Banuelos NP, for a Pap with HPV testing as recommended in a year.  If the results are the same, she doesn't need another colposcopy, but if the Pap is abnormal, she does.

## 2022-07-26 ENCOUNTER — ONCOLOGY VISIT (OUTPATIENT)
Dept: ONCOLOGY | Facility: CLINIC | Age: 67
End: 2022-07-26
Attending: INTERNAL MEDICINE
Payer: MEDICARE

## 2022-07-26 VITALS
HEIGHT: 65 IN | SYSTOLIC BLOOD PRESSURE: 109 MMHG | BODY MASS INDEX: 48.6 KG/M2 | DIASTOLIC BLOOD PRESSURE: 78 MMHG | TEMPERATURE: 98 F | WEIGHT: 291.7 LBS | RESPIRATION RATE: 16 BRPM | HEART RATE: 79 BPM | OXYGEN SATURATION: 95 %

## 2022-07-26 DIAGNOSIS — Z12.31 VISIT FOR SCREENING MAMMOGRAM: ICD-10-CM

## 2022-07-26 DIAGNOSIS — D05.12 BREAST NEOPLASM, TIS (DCIS), LEFT: ICD-10-CM

## 2022-07-26 DIAGNOSIS — D05.12 INTRADUCTAL CARCINOMA IN SITU OF LEFT BREAST: Primary | ICD-10-CM

## 2022-07-26 PROCEDURE — G0463 HOSPITAL OUTPT CLINIC VISIT: HCPCS

## 2022-07-26 PROCEDURE — 99214 OFFICE O/P EST MOD 30 MIN: CPT | Performed by: INTERNAL MEDICINE

## 2022-07-26 ASSESSMENT — PAIN SCALES - GENERAL: PAINLEVEL: NO PAIN (0)

## 2022-07-26 NOTE — PROGRESS NOTES
[facility] Hematology and Oncology Progress Note    Patient: Zaina Cortez  MRN: [id number]  Date of Service: Jul 26, 2022         Reason for Visit    Chief Complaint   Patient presents with     Oncology Clinic Visit     Neoplasm of left breast.          Assessment and Plan    Cancer Staging  No matching staging information was found for the patient.    ECOG Performance    0 - Independent     Pain  Pain Score: No Pain (0)    #.  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  Postmenopausal     Clinically well. Exam is unremarkable.  Mammogram from 3022 was benign.   Continue anastrozole. We will plan to complete 5 years of therapy (till around April 2026).    Continue annual screening mammogram.   Follow-up clinical exam in 6 months.     #.  Bone health.   She has normal bone density.  She takes multivitamins including calcium and vitamin D for daily requirements.  I discussed about the importance of weightbearing exercises, increasing activity.      We will plan to obtain next DEXA scan in 2023.       Encounter Diagnoses:    Problem List Items Addressed This Visit    None     Visit Diagnoses     Intraductal carcinoma in situ of left breast    -  Primary    Visit for screening mammogram        Relevant Orders    MA Screen Bilateral w/Iraj    Breast neoplasm, Tis (DCIS), left                 CC: Hortencia Banuelos, [place]   ______________________________________________________________________________  Diagnosis  2/26/2021-left breast DCIS by screening mammogram.     -High-grade, solid, comedo with focal comedonecrosis.  37 mm.  Margins were negative.   -ER positive (greater than 95%, strong)   -AL focal positive (5%, weak to moderate)    Treatment to date  2/26/2021-underwent left breast lumpectomy. (Dr. Marie)  4/6/2021-5/3/2021-completed adjuvant radiation of the left breast 5240 cGy in 20 treatments (Dr. Purcell).  4/2021-adjuvant endocrine therapy with  "anastrozole.    History of Present Illness    Ms. Zaina Cortez presented herself today.      She is doing well. She has a chronic recurring jaw pain. No changes since starting anastrozole.   She takes anastrozole regularly. She takes calcium sometimes but she is very good at taking vitamin D. No other concerns.     Review of systems  Apart from describing in HPI, the remainder of comprehensive ROS was negative.    Past History    Past Medical History:   Diagnosis Date     Breast cancer (H) 2021    DCIS     Cervical high risk HPV (human papillomavirus) test positive 05/05/2022 05/5/22     Hypertension      Sleep apnea     Created by Conversion  Replacement Utility updated for latest IMO load, Before Gastrectomy Used a CPAP no long uses       Past Surgical History:   Procedure Laterality Date     HC REMOVAL GALLBLADDER      Description: Cholecystectomy;  Recorded: 06/01/2012;  Comments: 04/2008 PAULINA Maxwell MA STEREOTACTIC BREAST BIOPSY VACUUM L Left 2/1/2021     [place] MASTECTOMY, PARTIAL Left 2/26/2021    Procedure: LEFT WIRE LOCALIZED LUMPECTOMY;  Surgeon: Joslyn Marie DO;  Location: [facility];  Service: General     [facility] REMV STOMACH,PART,DISTAL,GASTRODUOD      Description: Partial Gastrectomy;  Recorded: 12/23/2013;  Comments: Sleeve Gastrectomy for Bariatric Purposes.         Physical Exam    /78 (BP Location: Left leg, Patient Position: Sitting, Cuff Size: Adult Large)   Pulse 79   Temp 98  F (36.7  C) (Oral)   Resp 16   Ht 1.651 m (5' 5\")   Wt 132.3 kg (291 lb 11.2 oz)   SpO2 95%   BMI 48.54 kg/m      General: alert, awake, not in acute distress.   HEENT: Head: Normal, normocephalic, atraumatic.  Eye: Normal external eye, conjunctiva, lids cornea, [place].  Pharynx: Normal buccal mucosa. Normal pharynx.  Neck / Thyroid: Supple, no masses, nodes, nodules or enlargement.  Lymphatics: No abnormally enlarged lymph nodes.  Chest: Normal chest wall and respirations. Clear to " auscultation.  Breasts: bilateral pendulous breasts. Limited exam due to the body habitus.   Heart: S1 S2 RRR.   Abdomen: abdomen is soft without significant tenderness, masses, organomegaly or guarding  Extremities: normal strength, tone, and muscle mass  Skin: normal. no rash or abnormalities  CNS: non focal.    Lab Results    No results found for this or any previous visit (from the past 168 hour(s)).    Imaging    No results found.     30 minutes spent on the date of the encounter doing chart review, history and exam, documentation and further activities as noted above.    Signed by: Yasmany Alvarez MD

## 2022-07-26 NOTE — PROGRESS NOTES
"Oncology Rooming Note    July 26, 2022 10:49 AM   Zaina Cortez is a 67 year old female who presents for:    Chief Complaint   Patient presents with     Oncology Clinic Visit     Neoplasm of left breast.        Initial Vitals: /78 (BP Location: Left leg, Patient Position: Sitting, Cuff Size: Adult Large)   Pulse 79   Temp 98  F (36.7  C) (Oral)   Resp 16   Ht 1.651 m (5' 5\")   Wt 132.3 kg (291 lb 11.2 oz)   SpO2 95%   BMI 48.54 kg/m   Estimated body mass index is 48.54 kg/m  as calculated from the following:    Height as of this encounter: 1.651 m (5' 5\").    Weight as of this encounter: 132.3 kg (291 lb 11.2 oz). Body surface area is 2.46 meters squared.  No Pain (0) Comment: Data Unavailable   No LMP recorded. Patient is postmenopausal.  Allergies reviewed: Yes  Medications reviewed: Yes    Medications: Medication refills not needed today.  Pharmacy name entered into [employer]: [id number] [place], [place] - [address]    Clinical concerns:  No  Present  concerns.       Nany Jacobs LPN              "

## 2022-08-08 ENCOUNTER — PATIENT OUTREACH (OUTPATIENT)
Dept: FAMILY MEDICINE | Facility: CLINIC | Age: 67
End: 2022-08-08

## 2022-08-08 NOTE — TELEPHONE ENCOUNTER
7/14/22 [place] bx's taken visually normal. Plan cotest in 1 year due 05/5/23 per provider If the results are the same, she doesn't need another colposcopy, but if the Pap is abnormal, she does.

## 2022-10-01 ENCOUNTER — HEALTH MAINTENANCE LETTER (OUTPATIENT)
Age: 67
End: 2022-10-01

## 2022-11-03 ENCOUNTER — IMMUNIZATION (OUTPATIENT)
Dept: FAMILY MEDICINE | Facility: CLINIC | Age: 67
End: 2022-11-03
Payer: MEDICARE

## 2022-11-03 PROCEDURE — 90662 IIV NO PRSV INCREASED AG IM: CPT

## 2022-11-03 PROCEDURE — G0008 ADMIN INFLUENZA VIRUS VAC: HCPCS

## 2022-11-25 ENCOUNTER — IMMUNIZATION (OUTPATIENT)
Dept: NURSING | Facility: CLINIC | Age: 67
End: 2022-11-25
Payer: MEDICARE

## 2022-11-25 PROCEDURE — 91312 COVID-19 VACCINE BIVALENT BOOSTER 12+ (PFIZER): CPT

## 2022-11-25 PROCEDURE — 0124A COVID-19 VACCINE BIVALENT BOOSTER 12+ (PFIZER): CPT

## 2022-12-14 DIAGNOSIS — D05.12 INTRADUCTAL CARCINOMA IN SITU OF LEFT BREAST: ICD-10-CM

## 2022-12-14 RX ORDER — ANASTROZOLE 1 MG/1
TABLET ORAL
Qty: 90 TABLET | Refills: 1 | Status: SHIPPED | OUTPATIENT
Start: 2022-12-14 | End: 2023-06-12

## 2023-01-23 ENCOUNTER — ONCOLOGY VISIT (OUTPATIENT)
Dept: ONCOLOGY | Facility: CLINIC | Age: 68
End: 2023-01-23
Attending: NURSE PRACTITIONER
Payer: MEDICARE

## 2023-01-23 VITALS
DIASTOLIC BLOOD PRESSURE: 84 MMHG | HEIGHT: 65 IN | OXYGEN SATURATION: 95 % | SYSTOLIC BLOOD PRESSURE: 152 MMHG | HEART RATE: 92 BPM | WEIGHT: 283 LBS | BODY MASS INDEX: 47.15 KG/M2

## 2023-01-23 DIAGNOSIS — Z79.811 ENCOUNTER FOR MONITORING ANASTROZOLE THERAPY: ICD-10-CM

## 2023-01-23 DIAGNOSIS — E66.01 MORBID OBESITY (H): ICD-10-CM

## 2023-01-23 DIAGNOSIS — D05.12 INTRADUCTAL CARCINOMA IN SITU OF LEFT BREAST: Primary | ICD-10-CM

## 2023-01-23 DIAGNOSIS — Z51.81 ENCOUNTER FOR MONITORING ANASTROZOLE THERAPY: ICD-10-CM

## 2023-01-23 DIAGNOSIS — Z12.31 VISIT FOR SCREENING MAMMOGRAM: ICD-10-CM

## 2023-01-23 PROCEDURE — 99214 OFFICE O/P EST MOD 30 MIN: CPT | Performed by: NURSE PRACTITIONER

## 2023-01-23 PROCEDURE — G0463 HOSPITAL OUTPT CLINIC VISIT: HCPCS | Performed by: NURSE PRACTITIONER

## 2023-01-23 ASSESSMENT — PAIN SCALES - GENERAL: PAINLEVEL: MODERATE PAIN (4)

## 2023-01-23 NOTE — PROGRESS NOTES
"Oncology Rooming Note    January 23, 2023 10:48 AM   Zaina Cortez is a 67 year old female who presents for:    Chief Complaint   Patient presents with     Oncology Clinic Visit     Neoplasm of left breast, primary tumor staging category Tis: ductal carcinoma in situ (DCIS)     Initial Vitals: BP (!) 152/84   Pulse 92   Ht 1.651 m (5' 5\")   Wt 128.4 kg (283 lb)   SpO2 95%   BMI 47.09 kg/m   Estimated body mass index is 47.09 kg/m  as calculated from the following:    Height as of this encounter: 1.651 m (5' 5\").    Weight as of this encounter: 128.4 kg (283 lb). Body surface area is 2.43 meters squared.  Moderate Pain (4) Comment: Data Unavailable   No LMP recorded. Patient is postmenopausal.  Allergies reviewed: Yes  Medications reviewed: Yes    Medications: Medication refills not needed today.  Pharmacy name entered into [employer]: [id number] [place] [place] [facility] [address]    Clinical concerns: 6 month follow up      Roxie Osullivan MA            "

## 2023-01-23 NOTE — LETTER
"    1/23/2023         RE: Zaina Cortez  [address]  [facility] [place] [address]        Dear Colleague,    Thank you for referring your patient, Zaina Cortez, to the [facility] [place]. Please see a copy of my visit note below.    Oncology Rooming Note    January 23, 2023 10:48 AM   Zaina Cortez is a 67 year old female who presents for:    Chief Complaint   Patient presents with     Oncology Clinic Visit     Neoplasm of left breast, primary tumor staging category Tis: ductal carcinoma in situ (DCIS)     Initial Vitals: BP (!) 152/84   Pulse 92   Ht 1.651 m (5' 5\")   Wt 128.4 kg (283 lb)   SpO2 95%   BMI 47.09 kg/m   Estimated body mass index is 47.09 kg/m  as calculated from the following:    Height as of this encounter: 1.651 m (5' 5\").    Weight as of this encounter: 128.4 kg (283 lb). Body surface area is 2.43 meters squared.  Moderate Pain (4) Comment: Data Unavailable   No LMP recorded. Patient is postmenopausal.  Allergies reviewed: Yes  Medications reviewed: Yes    Medications: Medication refills not needed today.  Pharmacy name entered into [employer]: [id number] - [place], [place] - [address]    Clinical concerns: 6 month follow up      Roxie Osullivan MA              [facility] Hematology and Oncology Progress Note    Patient: Zaina Cortez  MRN: [id number]  Date of Service: Jan 23, 2023         Reason for Visit:    Scheduled f/up    Assessment and Plan:    1. ER and [place] (+) DCIS UOQ (L) breast ... going on 2 years s/p lumpectomy, followed by breast conservation EBRT, on adjuvant endocrine therapy with anastrozole.    67 year old Postmenopausal    Zaina presents alone.  She states she has been doing well.  She takes the anastrozole daily and has noted no appreciable side effects ... no hot flashes or myalgias or arthralgias.  Appetite good - weight quite stable this past year.  She denies cough, fever, chills, unusual headaches, visual or mentation disturbance, " bowel or bladder issues, rash.     Clinically stable/DAJUAN.  Benign mammogram from 03/01/2022.  Tolerating adjuvant AI therapy without incidence.    Continue daily anastrozole therapy.  Anticipating completing 5 years of AI therapy (April 2026).     03/06/2023 - annual screening mammogram (ordered).    Continue self-breast examinations.    Encouraged weight management through diet and exercise.     6-month f/up with clinical exam, annual mammogram, and DEXA scan.      2. Bone health:    07/07/2021 DEXA scan - NORMAL BONE DENSITY.    Currently on a multivitamin which includes calcium and vitamin D - unsure of dosaging.      Instructed on recommended 2533-9801 mg calcium + 8766-7946 international unit(s) vitamin D daily dosing.    Instructed on importanace of weightbearing exercises, increasing activity.       Update DEXA scan after ~2 years AI therapy.            Oncologic History:    1. DCIS UOQ (L) breast:    2021 - left breast DCIS by screening mammogram.                -High-grade, solid, comedo with focal comedonecrosis.  37 mm.  Margins were negative.              -ER positive (greater than 95%, strong)              -[place] focal positive (5%, weak to moderate)    02/26/2021 - left breast lumpectomy. (Dr. Marie)    05/03/2021 - completed (L) breast conservation EBRT, 5240 cGy in 20 fxs (Dr. Purcell).    2021, April - began adjuvant endocrine therapy with anastrozole, an aromatase inhibitor (AI).    ECOG Performance:    1 - Can't do physically strenuous work, but fully ambyulatory and can do light sedentary work    Pain:  Pain Score: Moderate Pain (4)  Pain Loc: Other - see comment (Jaw)    Encounter Diagnoses:    Problem List Items Addressed This Visit        Digestive    Morbid obesity (H)       Other    Encounter for monitoring anastrozole therapy    Relevant Orders    DX Hip/Pelvis/Spine   Other Visit Diagnoses     Intraductal carcinoma in situ of left breast    -  Primary    Relevant Orders    MA Screen Bilateral  "w/Iraj    Visit for screening mammogram        Relevant Orders    MA Screen Bilateral w/Iraj               32 minutes spent on the date of the encounter doing chart review, history and exam, documentation and further activities as noted above.    Ortiz Grove, CNP    CC: Hortencia Banuelos, NP   __________________________________________________________________    Review of Systems:    No fever or night sweats.  No loss of weight.  No lumps or bumps anywhere.  No unusual headaches or eyesight issues.  No dizziness.  No bleeding from the nose.  No sores in the mouth. No problems with swallowing.  No chest pain. No shortness of breath. No cough.  No abdominal pain. No nausea or vomiting.  No diarrhea or constipation.  No blood in stool or black colored stools.  No problems passing urine.  No numbness or tingling in hands or feet.  No skin rashes.    A 14 point review of systems is otherwise negative.    Past History:    Past Medical History:   Diagnosis Date     Breast cancer (H) 2021    DCIS     Cervical high risk HPV (human papillomavirus) test positive 05/05/2022 05/5/22     Hypertension      Sleep apnea     Created by Conversion  Replacement Utility updated for latest IMO load, Before Gastrectomy Used a CPAP no long uses       Past Surgical History:   Procedure Laterality Date     HC REMOVAL GALLBLADDER      Description: Cholecystectomy;  Recorded: 06/01/2012;  Comments: 04/2008 PAULINA Maxwell MA STEREOTACTIC BREAST BIOPSY VACUUM L Left 2/1/2021     [place] MASTECTOMY, PARTIAL Left 2/26/2021    Procedure: LEFT WIRE LOCALIZED LUMPECTOMY;  Surgeon: Joslyn Marie DO;  [facility]: [facility];  Service: General     [facility] REMV STOMACH,PART,DISTAL,GASTRODUOD      Description: Partial Gastrectomy;  Recorded: 12/23/2013;  Comments: Sleeve Gastrectomy for Bariatric Purposes.     Physical Exam:    BP (!) 152/84   Pulse 92   Ht 1.651 m (5' 5\")   Wt 128.4 kg (283 lb)   SpO2 95%   BMI 47.09 kg/m      GENERAL:   Alert " and oriented. Seated comfortably. In no distress.    HEENT:   Atraumatic and normocephalic.  TR[place]ON.  EOMI.  No pallor.  No icterus.  No mucosal lesions.    LYMPH NODES:  No palpable cervical, axillary or inguinal lymphadenopathy.    CHEST:   Lungs clear to auscultation bilaterally.    BREASTS:  (R) pendulous.  No concerning masses, skin, nipple, or axilla findings.     (L) pendulous.  C/W lumpectomy and EBRT changes.  No palpable masses.  Negative axilla, nipple and skin.    CVS:    S1 and S2 heard. Regular rate and rhythm.  No murmur or gallop or rub heard.  No peripheral edema.    ABDOMEN: S  Soft.  Not tender. Overweight.  No palpable hepatomegaly or splenomegaly.    EXTREMITIES:  Warm.    SKIN:    No rash, bruising or purpura noted.  Full head of hair.    Lab Results:    No results found for this or any previous visit (from the past 168 hour(s)).    Imaging:    No results found.        Again, thank you for allowing me to participate in the care of your patient.        Sincerely,        Ortiz Grove, CNP

## 2023-01-23 NOTE — PATIENT INSTRUCTIONS
4969-0808 mg calcium/day + 0298-5954 international unit(s) vitamin D daily   Anesthesia Type: 1% lidocaine with epinephrine

## 2023-01-23 NOTE — PROGRESS NOTES
[facility] Hematology and Oncology Progress Note    Patient: Zaina Cortez  MRN: [id number]  Date of Service: Jan 23, 2023         Reason for Visit:    Scheduled f/up    Assessment and Plan:    1. ER and [place] (+) DCIS UOQ (L) breast ... going on 2 years s/p lumpectomy, followed by breast conservation EBRT, on adjuvant endocrine therapy with anastrozole.    67 year old Postmenopausal    Zaina presents alone.  She states she has been doing well.  She takes the anastrozole daily and has noted no appreciable side effects ... no hot flashes or myalgias or arthralgias.  Appetite good - weight quite stable this past year.  She denies cough, fever, chills, unusual headaches, visual or mentation disturbance, bowel or bladder issues, rash.     Clinically stable/DAJUAN.  Benign mammogram from 03/01/2022.  Tolerating adjuvant AI therapy without incidence.    Continue daily anastrozole therapy.  Anticipating completing 5 years of AI therapy (April 2026).     03/06/2023 - annual screening mammogram (ordered).    Continue self-breast examinations.    Encouraged weight management through diet and exercise.     6-month f/up with clinical exam, annual mammogram, and DEXA scan.      2. Bone health:    07/07/2021 DEXA scan - NORMAL BONE DENSITY.    Currently on a multivitamin which includes calcium and vitamin D - unsure of dosaging.      Instructed on recommended 5570-9822 mg calcium + 4748-9154 international unit(s) vitamin D daily dosing.    Instructed on importanace of weightbearing exercises, increasing activity.       Update DEXA scan after ~2 years AI therapy.            Oncologic History:    1. DCIS UOQ (L) breast:    2021 - left breast DCIS by screening mammogram.                -High-grade, solid, comedo with focal comedonecrosis.  37 mm.  Margins were negative.              -ER positive (greater than 95%, strong)              -[place] focal positive (5%, weak to moderate)    02/26/2021 - left breast lumpectomy. (  Blanca    05/03/2021 - completed (L) breast conservation EBRT, 5240 cGy in 20 fxs (Dr. Purcell).    2021, April - began adjuvant endocrine therapy with anastrozole, an aromatase inhibitor (AI).    ECOG Performance:    1 - Can't do physically strenuous work, but fully ambyulatory and can do light sedentary work    Pain:  Pain Score: Moderate Pain (4)  Pain Loc: Other - see comment (Jaw)    Encounter Diagnoses:    Problem List Items Addressed This Visit        Digestive    Morbid obesity (H)       Other    Encounter for monitoring anastrozole therapy    Relevant Orders    DX Hip/Pelvis/Spine   Other Visit Diagnoses     Intraductal carcinoma in situ of left breast    -  Primary    Relevant Orders    MA Screen Bilateral w/Iraj    Visit for screening mammogram        Relevant Orders    MA Screen Bilateral w/Iraj               32 minutes spent on the date of the encounter doing chart review, history and exam, documentation and further activities as noted above.    Ortiz Grove, CNP    CC: Hortencia Banuelos, [place]   __________________________________________________________________    Review of Systems:    No fever or night sweats.  No loss of weight.  No lumps or bumps anywhere.  No unusual headaches or eyesight issues.  No dizziness.  No bleeding from the nose.  No sores in the mouth. No problems with swallowing.  No chest pain. No shortness of breath. No cough.  No abdominal pain. No nausea or vomiting.  No diarrhea or constipation.  No blood in stool or black colored stools.  No problems passing urine.  No numbness or tingling in hands or feet.  No skin rashes.    A 14 point review of systems is otherwise negative.    Past History:    Past Medical History:   Diagnosis Date     Breast cancer (H) 2021    DCIS     Cervical high risk HPV (human papillomavirus) test positive 05/05/2022 05/5/22     Hypertension      Sleep apnea     Created by Conversion  Replacement Utility updated for latest IMO load, Before Gastrectomy Used a  "CPAP no long uses       Past Surgical History:   Procedure Laterality Date     HC REMOVAL GALLBLADDER      Description: Cholecystectomy;  Recorded: 06/01/2012;  Comments: 04/2008 PAULINA Maxwell MA STEREOTACTIC BREAST BIOPSY VACUUM L Left 2/1/2021     [place] MASTECTOMY, PARTIAL Left 2/26/2021    Procedure: LEFT WIRE LOCALIZED LUMPECTOMY;  Surgeon: Joslyn Marie DO;  [facility];  Service: General     [facility] REMV STOMACH,PART,DISTAL,GASTRODUOD      Description: Partial Gastrectomy;  Recorded: 12/23/2013;  Comments: Sleeve Gastrectomy for Bariatric Purposes.     Physical Exam:    BP (!) 152/84   Pulse 92   Ht 1.651 m (5' 5\")   Wt 128.4 kg (283 lb)   SpO2 95%   BMI 47.09 kg/m      GENERAL:   Alert and oriented. Seated comfortably. In no distress.    HEENT:   Atraumatic and normocephalic.  TRENTON.  EOMI.  No pallor.  No icterus.  No mucosal lesions.    LYMPH NODES:  No palpable cervical, axillary or inguinal lymphadenopathy.    CHEST:   Lungs clear to auscultation bilaterally.    BREASTS:  (R) pendulous.  No concerning masses, skin, nipple, or axilla findings.     (L) pendulous.  C/W lumpectomy and EBRT changes.  No palpable masses.  Negative axilla, nipple and skin.    CVS:    S1 and S2 heard. Regular rate and rhythm.  No murmur or gallop or rub heard.  No peripheral edema.    ABDOMEN: S  Soft.  Not tender. Overweight.  No palpable hepatomegaly or splenomegaly.    EXTREMITIES:  Warm.    SKIN:    No rash, bruising or purpura noted.  Full head of hair.    Lab Results:    No results found for this or any previous visit (from the past 168 hour(s)).    Imaging:    No results found.    "

## 2023-03-06 ENCOUNTER — HOSPITAL ENCOUNTER (OUTPATIENT)
Dept: MAMMOGRAPHY | Facility: CLINIC | Age: 68
Discharge: HOME | End: 2023-03-06
Attending: NURSE PRACTITIONER | Admitting: NURSE PRACTITIONER
Payer: MEDICARE

## 2023-03-06 DIAGNOSIS — Z12.31 VISIT FOR SCREENING MAMMOGRAM: ICD-10-CM

## 2023-03-06 DIAGNOSIS — D05.12 INTRADUCTAL CARCINOMA IN SITU OF LEFT BREAST: ICD-10-CM

## 2023-03-06 PROCEDURE — 77067 SCR MAMMO BI INCL CAD: CPT

## 2023-04-17 ENCOUNTER — PATIENT OUTREACH (OUTPATIENT)
Dept: FAMILY MEDICINE | Facility: CLINIC | Age: 68
End: 2023-04-17
Payer: MEDICARE

## 2023-06-15 NOTE — TELEPHONE ENCOUNTER
Raphael Burnett,   Patient is lost to pap tracking follow-up. Attempts to contact pt have been made per reminder process and there has been no reply and/or no appt scheduled.  If you are wanting any additional contact attempts please send to your care team staff.     Pap Hx:  12/07/15 ASCUS Pap, Neg HR HPV   05/5/22 NIL Pap, + HR HPV type 16 plan [place] due bef 08/5/22 05/12/22 Left msg and Sky result note sent. -sky read  7/14/22 [place] bx's taken visually normal. Plan cotest in 1 year due 05/5/23 per provider If the results are the same, she doesn't need another colposcopy, but if the Pap is abnormal, she does.  04/17/23 Reminder Sky  05/15/23 Reminder call-melchor  06/15/23 Lost to follow-up for pap tracking, ashley routed to provider

## 2023-07-10 ENCOUNTER — ANCILLARY PROCEDURE (OUTPATIENT)
Dept: BONE DENSITY | Facility: CLINIC | Age: 68
End: 2023-07-10
Attending: NURSE PRACTITIONER
Payer: MEDICARE

## 2023-07-10 DIAGNOSIS — Z51.81 ENCOUNTER FOR MONITORING ANASTROZOLE THERAPY: ICD-10-CM

## 2023-07-10 DIAGNOSIS — Z79.811 ENCOUNTER FOR MONITORING ANASTROZOLE THERAPY: ICD-10-CM

## 2023-07-10 PROCEDURE — 77080 DXA BONE DENSITY AXIAL: CPT | Mod: TC | Performed by: PHYSICIAN ASSISTANT

## 2023-07-25 ENCOUNTER — ONCOLOGY VISIT (OUTPATIENT)
Dept: ONCOLOGY | Facility: CLINIC | Age: 68
End: 2023-07-25
Attending: NURSE PRACTITIONER
Payer: MEDICARE

## 2023-07-25 VITALS
BODY MASS INDEX: 47.82 KG/M2 | DIASTOLIC BLOOD PRESSURE: 79 MMHG | OXYGEN SATURATION: 96 % | RESPIRATION RATE: 16 BRPM | HEIGHT: 65 IN | SYSTOLIC BLOOD PRESSURE: 125 MMHG | WEIGHT: 287 LBS | HEART RATE: 91 BPM

## 2023-07-25 DIAGNOSIS — D05.12 INTRADUCTAL CARCINOMA IN SITU OF LEFT BREAST: ICD-10-CM

## 2023-07-25 DIAGNOSIS — Z12.31 VISIT FOR SCREENING MAMMOGRAM: Primary | ICD-10-CM

## 2023-07-25 PROCEDURE — G0463 HOSPITAL OUTPT CLINIC VISIT: HCPCS | Performed by: INTERNAL MEDICINE

## 2023-07-25 PROCEDURE — 99214 OFFICE O/P EST MOD 30 MIN: CPT | Performed by: INTERNAL MEDICINE

## 2023-07-25 RX ORDER — GABAPENTIN 300 MG/1
300 CAPSULE ORAL 2 TIMES DAILY
COMMUNITY
Start: 2023-07-06

## 2023-07-25 ASSESSMENT — PAIN SCALES - GENERAL: PAINLEVEL: NO PAIN (0)

## 2023-07-25 NOTE — PROGRESS NOTES
[facility] Hematology and Oncology Progress Note    Patient: Zaina Cortez  MRN: [id number]  Date of Service: Jul 25, 2023         Reason for Visit    Chief Complaint   Patient presents with     Oncology Clinic Visit     Neoplasm of left breast, primary tumor staging category Tis: ductal carcinoma in situ       Assessment and Plan     Cancer Staging   No matching staging information was found for the patient.    ECOG Performance    0 - Independent     Pain  Pain Score: No Pain (0)    #.  History of DCIS of the upper outer quadrant of the left breast, ER positive (greater than 95%), [place] focal positive (5%)  #.  Postmenopausal     Her clinical exam is unremarkable.  Mammogram from 3/2023 was benign.  She has good tolerance to anastrozole.  I recommended her to continue anastrozole to complete 5 years of therapy (till around April 2026).    Continue annual screening mammogram.  Requested screening mammogram for 3/2024.   Follow-up clinical exam in 1 year.  She is advised to call me sooner with any concerns.    #.  Bone health.   I reviewed the updated DEXA scan from 7/10/2023.  It showed a slight decline in lumbar spine BMD of 3%, and left hip BMD of 5.6%.  Her BMD remains normal.  Although it remains in normal bone density, I strongly encourage her to keep up with calcium/vitamin D and weightbearing exercises to prevent bone density decline with ongoing use of aromatase inhibitor.   We will plan to obtain next DEXA scan in 2025.       Encounter Diagnoses:    Problem List Items Addressed This Visit    None  Visit Diagnoses     Visit for screening mammogram    -  Primary    Relevant Orders    MA Screening Bilateral w/ Iraj    Intraductal carcinoma in situ of left breast        Relevant Medications    anastrozole (ARIMIDEX) 1 MG tablet               CC: Hortencia Banuelos, NP   ______________________________________________________________________________  Diagnosis  2/26/2021-left breast DCIS by screening mammogram.   "   -High-grade, solid, comedo with focal comedonecrosis.  37 mm.  Margins were negative.   -ER positive (greater than 95%, strong)   -[place] focal positive (5%, weak to moderate)    Treatment to date  2/26/2021-underwent left breast lumpectomy. (Dr. Marie)  4/6/2021-5/3/2021-completed adjuvant radiation of the left breast 5240 cGy in 20 treatments (Dr. Purcell).  4/2021-adjuvant endocrine therapy with anastrozole.    History of Present Illness    Ms. Zaina Cortez presented herself today.      She is doing well. She has a chronic recurring jaw pain.  She was diagnosed with trigeminal neuralgia and currently on gabapentin.  She has noted some improvement after starting that.    She takes anastrozole regularly.  She takes calcium sometimes but she is very good at taking vitamin D.  She has updated DEXA scan.    Review of systems  Apart from describing in HPI, the remainder of comprehensive ROS was negative.    Past History    Past Medical History:   Diagnosis Date     Breast cancer (H) 2021    DCIS     Cervical high risk HPV (human papillomavirus) test positive 05/05/2022 05/5/22     Hypertension      Sleep apnea     Created by Conversion  Replacement Utility updated for latest IMO load, Before Gastrectomy Used a CPAP no long uses       Past Surgical History:   Procedure Laterality Date     HC REMOVAL GALLBLADDER      Description: Cholecystectomy;  Recorded: 06/01/2012;  Comments: 04/2008 PAULINA Maxwell MA STEREOTACTIC BREAST BIOPSY VACUUM L Left 2/1/2021     [place] MASTECTOMY, PARTIAL Left 2/26/2021    Procedure: LEFT WIRE LOCALIZED LUMPECTOMY;  Surgeon: Joslny Marie DO;  Location: [facility];  Service: General     [facility] REMV STOMACH,PART,DISTAL,GASTRODUOD      Description: Partial Gastrectomy;  Recorded: 12/23/2013;  Comments: Sleeve Gastrectomy for Bariatric Purposes.         Physical Exam    /79   Pulse 91   Resp 16   Ht 1.651 m (5' 5\")   Wt 130.2 kg (287 lb)   SpO2 96%   BMI 47.76 kg/m  "     General: alert, awake, not in acute distress  HEENT: Head: Normal, normocephalic, atraumatic.  Eye: Normal external eye, conjunctiva, lids cornea, TRENTON.  Pharynx: Normal buccal mucosa. Normal pharynx.  Neck / Thyroid: Supple, no masses, nodes, nodules or enlargement.  Lymphatics: No abnormally enlarged lymph nodes.  Chest: Normal chest wall and respirations. Clear to auscultation.  Breasts: Bilateral pendulous breasts.  No discrete palpable masses.  Heart: S1 S2 RRR.   Abdomen: abdomen is soft without significant tenderness, masses, organomegaly or guarding  Extremities: normal strength, tone, and muscle mass  Skin: normal. no rash or abnormalities  CNS: non focal.    Lab Results    No results found for this or any previous visit (from the past 168 hour(s)).    Imaging    DX Hip/Pelvis/Spine    Result Date: 7/10/2023  EXAM: DX HIP/PELVIS/SPINE LOCATION: [facility] DATE: 7/10/2023 INDICATION: BMD screening, follow-up DEMOGRAPHICS: Age- 68 years. Gender- Female. Menopausal status- Postmenopausal. COMPARISON: 07/06/2021 TECHNIQUE: Dual-energy x-ray absorptiometry (DXA) performed with routine technique.  Trabecular bone score (TBS) analysis performed. FINDINGS: DXA RESULTS -Lumbar Spine: L1-L4 (L2, L3): BMD: 1.156 g/cm2. T-score: -0.1. Z-score: 0.4. Degenerative change may artifactually increase BMD. -RIGHT Hip Total: BMD: 1.029 g/cm2. T-score: 0.2. Z-score: 0.6. -RIGHT Hip Femoral neck: BMD: 0.934 g/cm2. T-score: -0.7. Z-score: 0.0. -LEFT Hip Total: BMD: 1.011 g/cm2. T-score: 0.0. Z-score: 0.4. -LEFT Hip Femoral neck: BMD: 0.895 g/cm2. T-score: -1.0. Z-score: -0.3. WHO T-SCORE CRITERIA -Normal: T score at or above -1 SD -Osteopenia: T score between -1 and -2.5 SD -Osteoporosis: T score at or below -2.5 SD The World Health Organization (WHO) criteria is applicable to perimenopausal females, postmenopausal females, and men aged 50 years or older. TBS RESULTS -Lumbar Spine L1-L4 (L2, L3): TBS:  1.387. TBS T-score: -0.5.TBS Z-score: 1.3. The TBS is a DXA derived measurement for fracture risk assessment, and reflects the structural condition of the bone microarchitecture. It can be used to adjust WHO Fracture Risk Assessment Tool (FRAX) probability of fracture in postmenopausal women and older men. The calculated probabilities of fracture have been shown to be more accurate when computed with the TBS. INTERVAL CHANGE -There has been a 3.0% decrease in lumbar spine BMD. -There has been a 5.6% decrease in left hip BMD. FRACTURE RISK -The FRAX risk calculator is not applicable due to normal bone mineral density.     IMPRESSION: NORMAL. Bone mineral density measurements are within normal limits using T score.     30 minutes spent on the date of the encounter doing chart review, history and exam, documentation, communication of the treatment plan with the care team and further activities as noted above.    Signed by: Yasmany Alvarez MD

## 2023-07-25 NOTE — LETTER
"    7/25/2023         RE: Zaina Cortez  [address]  [facility] [place] [address]        Dear Colleague,    Thank you for referring your patient, Zaina Cortez, to the [facility]. Please see a copy of my visit note below.    Oncology Rooming Note    July 25, 2023 10:22 AM   Zaina Cortez is a 68 year old female who presents for:    Chief Complaint   Patient presents with     Oncology Clinic Visit     Neoplasm of left breast, primary tumor staging category Tis: ductal carcinoma in situ     Initial Vitals: /79   Pulse 91   Resp 16   Ht 1.651 m (5' 5\")   Wt 130.2 kg (287 lb)   SpO2 96%   BMI 47.76 kg/m   Estimated body mass index is 47.76 kg/m  as calculated from the following:    Height as of this encounter: 1.651 m (5' 5\").    Weight as of this encounter: 130.2 kg (287 lb). Body surface area is 2.44 meters squared.  No Pain (0) Comment: Data Unavailable   No LMP recorded. Patient is postmenopausal.  Allergies reviewed: Yes  Medications reviewed: Yes    Medications: Medication refills not needed today.  Pharmacy name entered into [employer]: [id number] [place], [place] - [address]    Clinical concerns:  6 month follow up      Nolvia Low              [facility] Hematology and Oncology Progress Note    Patient: Zaina Cortez  MRN: [id number]  Date of Service: Jul 25, 2023         Reason for Visit    Chief Complaint   Patient presents with     Oncology Clinic Visit     Neoplasm of left breast, primary tumor staging category Tis: ductal carcinoma in situ       Assessment and Plan     Cancer Staging   No matching staging information was found for the patient.    ECOG Performance    0 - Independent     Pain  Pain Score: No Pain (0)    #.  History of DCIS of the upper outer quadrant of the left breast, ER positive (greater than 95%), NV focal positive (5%)  #.  Postmenopausal     Her clinical exam is unremarkable.  Mammogram from 3/2023 was benign.  She has " good tolerance to anastrozole.  I recommended her to continue anastrozole to complete 5 years of therapy (till around April 2026).    Continue annual screening mammogram.  Requested screening mammogram for 3/2024.   Follow-up clinical exam in 1 year.  She is advised to call me sooner with any concerns.    #.  Bone health.   I reviewed the updated DEXA scan from 7/10/2023.  It showed a slight decline in lumbar spine BMD of 3%, and left hip BMD of 5.6%.  Her BMD remains normal.  Although it remains in normal bone density, I strongly encourage her to keep up with calcium/vitamin D and weightbearing exercises to prevent bone density decline with ongoing use of aromatase inhibitor.   We will plan to obtain next DEXA scan in 2025.       Encounter Diagnoses:    Problem List Items Addressed This Visit    None  Visit Diagnoses     Visit for screening mammogram    -  Primary    Relevant Orders    MA Screening Bilateral w/ Iraj    Intraductal carcinoma in situ of left breast        Relevant Medications    anastrozole (ARIMIDEX) 1 MG tablet               CC: Hortencia Banuelos, NP   ______________________________________________________________________________  Diagnosis  2/26/2021-left breast DCIS by screening mammogram.     -High-grade, solid, comedo with focal comedonecrosis.  37 mm.  Margins were negative.   -ER positive (greater than 95%, strong)   -KS focal positive (5%, weak to moderate)    Treatment to date  2/26/2021-underwent left breast lumpectomy. (Dr. Marie)  4/6/2021-5/3/2021-completed adjuvant radiation of the left breast 5240 cGy in 20 treatments (Dr. Purcell).  4/2021-adjuvant endocrine therapy with anastrozole.    History of Present Illness    Ms. Zaina Cortez presented herself today.      She is doing well. She has a chronic recurring jaw pain.  She was diagnosed with trigeminal neuralgia and currently on gabapentin.  She has noted some improvement after starting that.    She takes anastrozole regularly.  She takes  "calcium sometimes but she is very good at taking vitamin D.  She has updated DEXA scan.    Review of systems  Apart from describing in HPI, the remainder of comprehensive ROS was negative.    Past History    Past Medical History:   Diagnosis Date     Breast cancer (H) 2021    DCIS     Cervical high risk HPV (human papillomavirus) test positive 05/05/2022 05/5/22     Hypertension      Sleep apnea     Created by Conversion  Replacement Utility updated for latest IMO load, Before Gastrectomy Used a CPAP no long uses       Past Surgical History:   Procedure Laterality Date     [facility] REMOVAL GALLBLADDER      Description: Cholecystectomy;  Recorded: 06/01/2012;  Comments: 04/2008 [facility] Dr.R. Alissa PAULSON STEREOTACTIC BREAST BIOPSY VACUUM L Left 2/1/2021     AZ MASTECTOMY, PARTIAL Left 2/26/2021    Procedure: LEFT WIRE LOCALIZED LUMPECTOMY;  Surgeon: Joslyn Marie DO;  Location: [facility];  Service: General     [facility] REMV STOMACH,PART,DISTAL,GASTRODUOD      Description: Partial Gastrectomy;  Recorded: 12/23/2013;  Comments: Sleeve Gastrectomy for Bariatric Purposes.         Physical Exam    /79   Pulse 91   Resp 16   Ht 1.651 m (5' 5\")   Wt 130.2 kg (287 lb)   SpO2 96%   BMI 47.76 kg/m      General: alert, awake, not in acute distress  HEENT: Head: Normal, normocephalic, atraumatic.  Eye: Normal external eye, conjunctiva, lids cornea, [place].  Pharynx: Normal buccal mucosa. Normal pharynx.  Neck / Thyroid: Supple, no masses, nodes, nodules or enlargement.  Lymphatics: No abnormally enlarged lymph nodes.  Chest: Normal chest wall and respirations. Clear to auscultation.  Breasts: Bilateral pendulous breasts.  No discrete palpable masses.  Heart: S1 S2 RRR.   Abdomen: abdomen is soft without significant tenderness, masses, organomegaly or guarding  Extremities: normal strength, tone, and muscle mass  Skin: normal. no rash or abnormalities  CNS: non focal.    Lab Results    No results found for this or any " previous visit (from the past 168 hour(s)).    Imaging    DX Hip/Pelvis/Spine    Result Date: 7/10/2023  EXAM: DX HIP/PELVIS/SPINE LOCATION: [facility] DATE: 7/10/2023 INDICATION: BMD screening, follow-up DEMOGRAPHICS: Age- 68 years. Gender- Female. Menopausal status- Postmenopausal. COMPARISON: 07/06/2021 TECHNIQUE: Dual-energy x-ray absorptiometry (DXA) performed with routine technique.  Trabecular bone score (TBS) analysis performed. FINDINGS: DXA RESULTS -Lumbar Spine: L1-L4 (L2, L3): BMD: 1.156 g/cm2. T-score: -0.1. Z-score: 0.4. Degenerative change may artifactually increase BMD. -RIGHT Hip Total: BMD: 1.029 g/cm2. T-score: 0.2. Z-score: 0.6. -RIGHT Hip Femoral neck: BMD: 0.934 g/cm2. T-score: -0.7. Z-score: 0.0. -LEFT Hip Total: BMD: 1.011 g/cm2. T-score: 0.0. Z-score: 0.4. -LEFT Hip Femoral neck: BMD: 0.895 g/cm2. T-score: -1.0. Z-score: -0.3. WHO T-SCORE CRITERIA -Normal: T score at or above -1 SD -Osteopenia: T score between -1 and -2.5 SD -Osteoporosis: T score at or below -2.5 SD The World Health Organization (WHO) criteria is applicable to perimenopausal females, postmenopausal females, and men aged 50 years or older. TBS RESULTS -Lumbar Spine L1-L4 (L2, L3): TBS: 1.387. TBS T-score: -0.5.TBS Z-score: 1.3. The TBS is a DXA derived measurement for fracture risk assessment, and reflects the structural condition of the bone microarchitecture. It can be used to adjust WHO Fracture Risk Assessment Tool (FRAX) probability of fracture in postmenopausal women and older men. The calculated probabilities of fracture have been shown to be more accurate when computed with the TBS. INTERVAL CHANGE -There has been a 3.0% decrease in lumbar spine BMD. -There has been a 5.6% decrease in left hip BMD. FRACTURE RISK -The FRAX risk calculator is not applicable due to normal bone mineral density.     IMPRESSION: NORMAL. Bone mineral density measurements are within normal limits using T score.     30  minutes spent on the date of the encounter doing chart review, history and exam, documentation, communication of the treatment plan with the care team and further activities as noted above.    Signed by: Yasmany Alvarez MD      Again, thank you for allowing me to participate in the care of your patient.        Sincerely,        Yasmany Alvarez MD

## 2023-07-25 NOTE — PROGRESS NOTES
"Oncology Rooming Note    July 25, 2023 10:22 AM   Zaina Cortez is a 68 year old female who presents for:    Chief Complaint   Patient presents with    Oncology Clinic Visit     Neoplasm of left breast, primary tumor staging category Tis: ductal carcinoma in situ     Initial Vitals: /79   Pulse 91   Resp 16   Ht 1.651 m (5' 5\")   Wt 130.2 kg (287 lb)   SpO2 96%   BMI 47.76 kg/m   Estimated body mass index is 47.76 kg/m  as calculated from the following:    Height as of this encounter: 1.651 m (5' 5\").    Weight as of this encounter: 130.2 kg (287 lb). Body surface area is 2.44 meters squared.  No Pain (0) Comment: Data Unavailable   No LMP recorded. Patient is postmenopausal.  Allergies reviewed: Yes  Medications reviewed: Yes    Medications: Medication refills not needed today.  Pharmacy name entered into [employer]: [id number] [place], [place] - [address]    Clinical concerns:  6 month follow up      Nolvia Low            "

## 2023-07-31 ENCOUNTER — TRANSFERRED RECORDS (OUTPATIENT)
Dept: HEALTH INFORMATION MANAGEMENT | Facility: CLINIC | Age: 68
End: 2023-07-31
Payer: MEDICARE

## 2023-08-06 ENCOUNTER — HEALTH MAINTENANCE LETTER (OUTPATIENT)
Age: 68
End: 2023-08-06

## 2023-08-06 RX ORDER — ANASTROZOLE 1 MG/1
1 TABLET ORAL DAILY
Qty: 90 TABLET | Refills: 3 | Status: SHIPPED | OUTPATIENT
Start: 2023-08-06 | End: 2024-07-03

## 2023-09-22 DIAGNOSIS — I10 ESSENTIAL HYPERTENSION: ICD-10-CM

## 2023-09-22 RX ORDER — LISINOPRIL 5 MG/1
5 TABLET ORAL DAILY
Qty: 30 TABLET | Refills: 0 | Status: SHIPPED | OUTPATIENT
Start: 2023-09-22 | End: 2023-10-03

## 2023-10-03 ENCOUNTER — OFFICE VISIT (OUTPATIENT)
Dept: FAMILY MEDICINE | Facility: CLINIC | Age: 68
End: 2023-10-03
Payer: MEDICARE

## 2023-10-03 ENCOUNTER — LAB (OUTPATIENT)
Dept: FAMILY MEDICINE | Facility: CLINIC | Age: 68
End: 2023-10-03

## 2023-10-03 VITALS
SYSTOLIC BLOOD PRESSURE: 132 MMHG | BODY MASS INDEX: 48.32 KG/M2 | HEART RATE: 79 BPM | OXYGEN SATURATION: 95 % | DIASTOLIC BLOOD PRESSURE: 87 MMHG | TEMPERATURE: 97.2 F | RESPIRATION RATE: 16 BRPM | WEIGHT: 290 LBS | HEIGHT: 65 IN

## 2023-10-03 DIAGNOSIS — Z13.29 SCREENING FOR THYROID DISORDER: ICD-10-CM

## 2023-10-03 DIAGNOSIS — Z13.1 SCREENING FOR DIABETES MELLITUS: ICD-10-CM

## 2023-10-03 DIAGNOSIS — I10 ESSENTIAL HYPERTENSION: ICD-10-CM

## 2023-10-03 DIAGNOSIS — Z11.59 NEED FOR HEPATITIS C SCREENING TEST: ICD-10-CM

## 2023-10-03 DIAGNOSIS — R07.89 INTERMITTENT LEFT-SIDED CHEST PAIN: ICD-10-CM

## 2023-10-03 DIAGNOSIS — Z12.11 SCREEN FOR COLON CANCER: ICD-10-CM

## 2023-10-03 DIAGNOSIS — Z00.00 WELCOME TO MEDICARE PREVENTIVE VISIT: Primary | ICD-10-CM

## 2023-10-03 DIAGNOSIS — R13.10 DYSPHAGIA, UNSPECIFIED TYPE: ICD-10-CM

## 2023-10-03 DIAGNOSIS — Z12.4 SCREENING FOR MALIGNANT NEOPLASM OF CERVIX: ICD-10-CM

## 2023-10-03 LAB
ALBUMIN SERPL BCG-MCNC: 4 G/DL (ref 3.5–5.2)
ALP SERPL-CCNC: 97 U/L (ref 35–104)
ALT SERPL W P-5'-P-CCNC: 15 U/L (ref 0–50)
ANION GAP SERPL CALCULATED.3IONS-SCNC: 15 MMOL/L (ref 7–15)
AST SERPL W P-5'-P-CCNC: 23 U/L (ref 0–45)
ATRIAL RATE - MUSE: 67 BPM
BILIRUB SERPL-MCNC: 0.6 MG/DL
BUN SERPL-MCNC: 21.9 MG/DL (ref 8–23)
CALCIUM SERPL-MCNC: 9.6 MG/DL (ref 8.8–10.2)
CHLORIDE SERPL-SCNC: 105 MMOL/L (ref 98–107)
CHOLEST SERPL-MCNC: 216 MG/DL
CREAT SERPL-MCNC: 1.06 MG/DL (ref 0.51–0.95)
DEPRECATED HCO3 PLAS-SCNC: 23 MMOL/L (ref 22–29)
DIASTOLIC BLOOD PRESSURE - MUSE: NORMAL MMHG
EGFRCR SERPLBLD CKD-EPI 2021: 58 ML/MIN/1.73M2
ERYTHROCYTE [DISTWIDTH] IN BLOOD BY AUTOMATED COUNT: 12.9 % (ref 10–15)
GLUCOSE SERPL-MCNC: 97 MG/DL (ref 70–99)
HCT VFR BLD AUTO: 41.7 % (ref 35–47)
HCV AB SERPL QL IA: NONREACTIVE
HDLC SERPL-MCNC: 63 MG/DL
HGB BLD-MCNC: 13.2 G/DL (ref 11.7–15.7)
INTERPRETATION ECG - MUSE: NORMAL
LDLC SERPL CALC-MCNC: 127 MG/DL
MCH RBC QN AUTO: 29.7 PG (ref 26.5–33)
MCHC RBC AUTO-ENTMCNC: 31.7 G/DL (ref 31.5–36.5)
MCV RBC AUTO: 94 FL (ref 78–100)
NONHDLC SERPL-MCNC: 153 MG/DL
P AXIS - MUSE: 46 DEGREES
PLATELET # BLD AUTO: 277 10E3/UL (ref 150–450)
POTASSIUM SERPL-SCNC: 4.4 MMOL/L (ref 3.4–5.3)
PR INTERVAL - MUSE: 150 MS
PROT SERPL-MCNC: 7 G/DL (ref 6.4–8.3)
QRS DURATION - MUSE: 134 MS
QT - MUSE: 410 MS
QTC - MUSE: 433 MS
R AXIS - MUSE: 2 DEGREES
RBC # BLD AUTO: 4.45 10E6/UL (ref 3.8–5.2)
SODIUM SERPL-SCNC: 143 MMOL/L (ref 135–145)
SYSTOLIC BLOOD PRESSURE - MUSE: NORMAL MMHG
T AXIS - MUSE: 7 DEGREES
TRIGL SERPL-MCNC: 131 MG/DL
TSH SERPL DL<=0.005 MIU/L-ACNC: 1.4 UIU/ML (ref 0.3–4.2)
VENTRICULAR RATE- MUSE: 67 BPM
WBC # BLD AUTO: 4.3 10E3/UL (ref 4–11)

## 2023-10-03 PROCEDURE — 85027 COMPLETE CBC AUTOMATED: CPT | Performed by: NURSE PRACTITIONER

## 2023-10-03 PROCEDURE — 80061 LIPID PANEL: CPT | Performed by: NURSE PRACTITIONER

## 2023-10-03 PROCEDURE — 90662 IIV NO PRSV INCREASED AG IM: CPT | Performed by: NURSE PRACTITIONER

## 2023-10-03 PROCEDURE — 36415 COLL VENOUS BLD VENIPUNCTURE: CPT | Performed by: NURSE PRACTITIONER

## 2023-10-03 PROCEDURE — 80053 COMPREHEN METABOLIC PANEL: CPT | Performed by: NURSE PRACTITIONER

## 2023-10-03 PROCEDURE — G0124 SCREEN C/V THIN LAYER BY MD: HCPCS | Performed by: PATHOLOGY

## 2023-10-03 PROCEDURE — G0008 ADMIN INFLUENZA VIRUS VAC: HCPCS | Performed by: NURSE PRACTITIONER

## 2023-10-03 PROCEDURE — 86803 HEPATITIS C AB TEST: CPT | Performed by: NURSE PRACTITIONER

## 2023-10-03 PROCEDURE — 93005 ELECTROCARDIOGRAM TRACING: CPT | Performed by: NURSE PRACTITIONER

## 2023-10-03 PROCEDURE — 87624 HPV HI-RISK TYP POOLED RSLT: CPT | Performed by: NURSE PRACTITIONER

## 2023-10-03 PROCEDURE — G0438 PPPS, INITIAL VISIT: HCPCS | Performed by: NURSE PRACTITIONER

## 2023-10-03 PROCEDURE — G0009 ADMIN PNEUMOCOCCAL VACCINE: HCPCS | Performed by: NURSE PRACTITIONER

## 2023-10-03 PROCEDURE — 84443 ASSAY THYROID STIM HORMONE: CPT | Performed by: NURSE PRACTITIONER

## 2023-10-03 PROCEDURE — 90677 PCV20 VACCINE IM: CPT | Performed by: NURSE PRACTITIONER

## 2023-10-03 PROCEDURE — 93010 ELECTROCARDIOGRAM REPORT: CPT | Mod: OFF | Performed by: INTERNAL MEDICINE

## 2023-10-03 PROCEDURE — G0145 SCR C/V CYTO,THINLAYER,RESCR: HCPCS | Performed by: NURSE PRACTITIONER

## 2023-10-03 PROCEDURE — 99213 OFFICE O/P EST LOW 20 MIN: CPT | Mod: 25 | Performed by: NURSE PRACTITIONER

## 2023-10-03 RX ORDER — LISINOPRIL 5 MG/1
5 TABLET ORAL DAILY
Qty: 90 TABLET | Refills: 3 | Status: SHIPPED | OUTPATIENT
Start: 2023-10-03

## 2023-10-03 NOTE — PROGRESS NOTES
"  Assessment & Plan     Welcome to Medicare preventive visit  - CBC with platelets    Screening for malignant neoplasm of cervix  - Pap screen with HPV - recommended age 30 - 65 years  - HPV Hold (Lab Only)  - HPV High Risk Types DNA Cervical    Screen for colon cancer  - COLOGUARD([employer])    Need for hepatitis C screening test  - Hepatitis C Screen Reflex to HCV RNA Quant and Genotype    Essential hypertension  Optimal control  - lisinopril (ZESTRIL) 5 MG tablet  Dispense: 90 tablet; Refill: 3  - Comprehensive metabolic panel (BMP + Alb, Alk Phos, ALT, AST, Total. Bili, TP)    Intermittent left-sided chest pain  EKG completed and unchanged from previous per cardiology report.  - EKG 12-lead, tracing only    Screening for diabetes mellitus  - Lipid panel reflex to direct LDL Fasting    Screening for thyroid disorder  - TSH with free T4 reflex    Dysphagia  Intermittent difficulty with swallowing.  Discussed an EGD for better assessment.  Patient declines and would like to monitor for now.     BMI:   Estimated body mass index is 48.26 kg/m  as calculated from the following:    Height as of this encounter: 1.651 m (5' 5\").    Weight as of this encounter: 131.5 kg (290 lb).       Hortencia Banuelos NP  [facility]    Delroy Mahajan is a 68 year old who presents for an annual wellness.    On Lisinopril for hypertension.  She is not checking Bps at home.    Continues on Arimidex due to history of breast cancer.    On Gabapentin for trigeminal neuralgia.    Has had some more difficulty with swallowing.  Feels like throat is more restricted and food intermittently gets stuck.    Has \"regular\" chest pain several times per day.  It occurs under her left breast and goes around her shoulder blade.  Not worse with activity.  Occasional heartburn that she takes Pepcid for.  No SOB.       History of Present Illness       Hypertension: She presents for follow up of hypertension.  She does not " "check blood pressure  regularly outside of the clinic. Outside blood pressures have been over 140/90. She does not follow a low salt diet.     She eats 0-1 servings of fruits and vegetables daily.She consumes 0 sweetened beverage(s) daily.She exercises with enough effort to increase her heart rate 9 or less minutes per day.  She exercises with enough effort to increase her heart rate 3 or less days per week.   She is taking medications regularly.     Annual Wellness Visit    Patient has been advised of split billing requirements and indicates understanding: Yes     Are you in the first 12 months of your Medicare Part B coverage?  Yes,  Visual Acuity:  Right Eye: 10/25   Left Eye: 10/16  Both Eyes: 10/12.5    Physical Health:  In general, how would you rate your overall physical health? fair  Outside of work, how many days during the week do you exercise?none  Outside of work, approximately how many minutes a day do you exercise?not applicable  If you drink alcohol do you typically have >3 drinks per day or >7 drinks per week? No  Do you usually eat at least 4 servings of fruit and vegetables a day, include whole grains & fiber and avoid regularly eating high fat or \"junk\" foods? No  Do you have any problems taking medications regularly? No  Do you have any side effects from medications? none  Needs assistance for the following daily activities: no assistance needed  Which of the following safety concerns are present in your home?  none identified   Hearing impairment: No  In the past 6 months, have you been bothered by leaking of urine? no    Mental Health:  In general, how would you rate your overall mental or emotional health? good    Today's PHQ-2 Score:       10/3/2023    10:11 AM   PHQ-2 ( 1999 [employer])   Q1: Little interest or pleasure in doing things 0   Q2: Feeling down, depressed or hopeless 0   PHQ-2 Score 0   Q1: Little interest or pleasure in doing things Not at all   Q2: Feeling down, depressed or " hopeless Not at all   PHQ-2 Score 0         Do you feel safe in your environment? Yes    Have you ever done Advance Care Planning? (For example, a Health Directive, POLST, or a discussion with a medical provider or your loved ones about your wishes)? No, advance care planning information given to patient to review.  Patient declined advance care planning discussion at this time.    Fall risk:  Fallen 2 or more times in the past year?: No  Any fall with injury in the past year?: No  click delete button to remove this line now  Cognitive Screenin) Repeat 3 items (Leader, Season, Table)    2) Clock draw: NORMAL  3) 3 item recall: Recalls 3 objects  Results: 3 items recalled: COGNITIVE IMPAIRMENT LESS LIKELY    Mini-CogTM Copyright S [employer]. Licensed by the author for use in [facility] Secure[employer]; reprinted with permission (nazia@[facility]). All rights reserved.      Do you have sleep apnea, excessive snoring or daytime drowsiness? : yes    Social History     Tobacco Use    Smoking status: Never    Smokeless tobacco: Never   Substance Use Topics    Alcohol use: Yes     Alcohol/week: 0.0 standard drinks of alcohol     Comment: Alcoholic Drinks/day: 2 drinks per week             2022     1:30 PM   Alcohol Use   Prescreen: >3 drinks/day or >7 drinks/week? No          No data to display              Do you have a current opioid prescription? No  Do you use any other controlled substances or medications that are not prescribed by a provider? None    Current providers sharing in care for this patient include:   Patient Care Team:  Hortencia Banuelos NP as PCP - Taylor Carmichael MD as MD (Hematology & Oncology)  Emily Coyle NP as Nurse Practitioner (Hematology & Oncology)  Yasmany Alvarez MD as MD (Hematology & Oncology)  Ruby Koo, RN as Specialty Care Coordinator (Hematology & Oncology)  Hortencia Banuelos NP as Assigned PCP  Symone Jernigan MD as Assigned OBGYN Provider  Yasmany Alvarez MD as Assigned Cancer  "Care Provider    The following health maintenance items are reviewed in Epic and correct as of today:  Health Maintenance   Topic Date Due    COLORECTAL CANCER SCREENING  Never done    HEPATITIS C SCREENING  Never done    ZOSTER IMMUNIZATION (1 of 2) Never done    Pneumococcal Vaccine: 65+ Years (1 - PCV) Never done    ANNUAL REVIEW OF HM ORDERS  05/05/2023    HPV FOLLOW-UP  05/05/2023    INFLUENZA VACCINE (1) 09/01/2023    COVID-19 Vaccine (6 - 2023-24 season) 09/01/2023    MEDICARE ANNUAL WELLNESS VISIT  10/03/2024    FALL RISK ASSESSMENT  10/03/2024    MAMMO SCREENING  03/06/2025    DTAP/TDAP/TD IMMUNIZATION (2 - Td or Tdap) 12/07/2025    LIPID  05/11/2027    ADVANCE CARE PLANNING  05/11/2027    DEXA  07/10/2038    PHQ-2 (once per calendar year)  Completed    PAP FOLLOW-UP  Completed    IPV IMMUNIZATION  Aged Out    HPV IMMUNIZATION  Aged Out    MENINGITIS IMMUNIZATION  Aged Out    PAP  Discontinued       Patient has been advised of split billing requirements and indicates understanding: Yes    Appropriate preventive services were discussed with this patient, including applicable screening as appropriate for fall prevention, nutrition, physical activity, Tobacco-use cessation, weight loss and cognition.  Checklist reviewing preventive services available has been given to the patient.        Review of Systems   Pertinent items in HPI        Objective    /87 (BP Location: Right arm, Patient Position: Sitting, Cuff Size: Adult Large)   Pulse 79   Temp 97.2  F (36.2  C) (Temporal)   Resp 16   Ht 1.651 m (5' 5\")   Wt 131.5 kg (290 lb)   SpO2 95%   BMI 48.26 kg/m    Body mass index is 48.26 kg/m .  Physical Exam   GENERAL: healthy, alert and no distress  EYES: Eyes grossly normal to inspection, PERRL and conjunctivae and sclerae normal  HENT: ear canals and TM's normal, nose and mouth without ulcers or lesions  NECK: no adenopathy, no asymmetry, masses, or scars and thyroid normal to palpation  RESP: lungs " clear to auscultation - no rales, rhonchi or wheezes  CV: regular rate and rhythm, normal S1 S2, no S3 or S4, no murmur, click or rub, no peripheral edema  ABDOMEN: soft, nontender, no hepatosplenomegaly, no masses and bowel sounds normal   (female): normal female external genitalia, normal urethral meatus, vaginal mucosa pink, moist, well rugated, and normal cervix/adnexa/uterus without masses or discharge  MS: no gross musculoskeletal defects noted, no edema  SKIN: no suspicious lesions or rashes  NEURO: Normal strength and tone, mentation intact and speech normal  PSYCH: mentation appears normal, affect normal/bright

## 2023-10-03 NOTE — PATIENT INSTRUCTIONS
Patient Education   Personalized Prevention Plan  You are due for the preventive services outlined below.  Your care team is available to assist you in scheduling these services.  If you have already completed any of these items, please share that information with your care team to update in your medical record.  Health Maintenance Due   Topic Date Due     Colorectal Cancer Screening  Never done     Hepatitis C Screening  Never done     Zoster (Shingles) Vaccine (1 of 2) Never done     Pneumococcal Vaccine (1 - PCV) Never done     Annual Wellness Visit  05/05/2023     ANNUAL REVIEW OF HM ORDERS  05/05/2023     HPV Follow Up  05/05/2023     Flu Vaccine (1) 09/01/2023     COVID-19 Vaccine (6 - 2023-24 season) 09/01/2023

## 2023-10-06 LAB
BKR LAB AP GYN ADEQUACY: ABNORMAL
BKR LAB AP GYN INTERPRETATION: ABNORMAL
BKR LAB AP GYN OTHER FINDINGS: ABNORMAL
BKR LAB AP HPV REFLEX: ABNORMAL
BKR LAB AP PREVIOUS ABNL DX: ABNORMAL
BKR LAB AP PREVIOUS ABNORMAL: ABNORMAL
PATH REPORT.COMMENTS IMP SPEC: ABNORMAL
PATH REPORT.COMMENTS IMP SPEC: ABNORMAL
PATH REPORT.RELEVANT HX SPEC: ABNORMAL

## 2023-10-08 ENCOUNTER — MYC MEDICAL ADVICE (OUTPATIENT)
Dept: FAMILY MEDICINE | Facility: CLINIC | Age: 68
End: 2023-10-08
Payer: MEDICARE

## 2023-10-08 DIAGNOSIS — E78.2 MIXED HYPERLIPIDEMIA: Primary | ICD-10-CM

## 2023-10-10 RX ORDER — SIMVASTATIN 10 MG
10 TABLET ORAL AT BEDTIME
Qty: 90 TABLET | Refills: 3 | Status: SHIPPED | OUTPATIENT
Start: 2023-10-10

## 2023-10-11 ENCOUNTER — PATIENT OUTREACH (OUTPATIENT)
Dept: FAMILY MEDICINE | Facility: CLINIC | Age: 68
End: 2023-10-11
Payer: MEDICARE

## 2023-10-24 LAB — NONINV COLON CA DNA+OCC BLD SCRN STL QL: NEGATIVE

## 2023-12-07 ENCOUNTER — OFFICE VISIT (OUTPATIENT)
Dept: OBGYN | Facility: CLINIC | Age: 68
End: 2023-12-07
Payer: MEDICARE

## 2023-12-07 VITALS
HEART RATE: 79 BPM | SYSTOLIC BLOOD PRESSURE: 122 MMHG | OXYGEN SATURATION: 97 % | WEIGHT: 287 LBS | BODY MASS INDEX: 47.76 KG/M2 | DIASTOLIC BLOOD PRESSURE: 78 MMHG

## 2023-12-07 DIAGNOSIS — R87.810 ASCUS WITH POSITIVE HIGH RISK HPV CERVICAL: Primary | ICD-10-CM

## 2023-12-07 DIAGNOSIS — R87.610 ASCUS WITH POSITIVE HIGH RISK HPV CERVICAL: Primary | ICD-10-CM

## 2023-12-07 PROCEDURE — 57452 EXAM OF CERVIX W/SCOPE: CPT | Performed by: OBSTETRICS & GYNECOLOGY

## 2023-12-07 NOTE — PROGRESS NOTES
Colposcopy Procedure Note    Indications: Pap smear on 10/3/23 showed ASCUS with type 16 HRHPV.  Pertinent past history includes a Pap with NILM with type 16 HRHPV on 5/5/22.  Colposcopy 7/14/22 was normal.    Procedure Details   /78 (BP Location: Right arm, Patient Position: Sitting, Cuff Size: Adult Large)   Pulse 79   Wt 130.2 kg (287 lb)   Body mass index is 47.76 kg/m .    The reason for procedure is explained, and informed consent is obtained.      Speculum is placed in the vagina and visualization of cervix is achieved. The cervix is swabbed with 3% acetic acid solution. Transformation zone is easily seen.  Green filter is applied with no abnormal vessels seen.  Acetowhite epithelium is not seen.  ECC is not done.  Adequate colposcopy.  The patient tolerated the procedure well.    Impression: normal cervix    Plan: Post procedure instructions are reviewed.  The role of HPV in causing Pap smear abnormalities, dysplasia, and cancer is reviewed with the patient.  We discussed the role of the immune system and ways to keep it strong.  She was counseled to return to Hortencia Banuelos NP, for a Pap with HPV testing as recommended in a year.

## 2023-12-29 ENCOUNTER — IMMUNIZATION (OUTPATIENT)
Dept: NURSING | Facility: CLINIC | Age: 68
End: 2023-12-29
Payer: MEDICARE

## 2023-12-29 PROCEDURE — 91320 SARSCV2 VAC 30MCG TRS-SUC IM: CPT

## 2023-12-29 PROCEDURE — 90480 ADMN SARSCOV2 VAC 1/ONLY CMP: CPT

## 2024-01-04 ENCOUNTER — PATIENT OUTREACH (OUTPATIENT)
Dept: FAMILY MEDICINE | Facility: CLINIC | Age: 69
End: 2024-01-04
Payer: MEDICARE

## 2024-01-04 PROBLEM — R87.810 CERVICAL HIGH RISK HPV (HUMAN PAPILLOMAVIRUS) TEST POSITIVE: Status: ACTIVE | Noted: 2022-05-05

## 2024-03-15 ENCOUNTER — HOSPITAL ENCOUNTER (OUTPATIENT)
Dept: MAMMOGRAPHY | Facility: CLINIC | Age: 69
Discharge: HOME | End: 2024-03-15
Attending: INTERNAL MEDICINE | Admitting: INTERNAL MEDICINE
Payer: MEDICARE

## 2024-03-15 DIAGNOSIS — Z12.31 VISIT FOR SCREENING MAMMOGRAM: ICD-10-CM

## 2024-03-15 PROCEDURE — 77063 BREAST TOMOSYNTHESIS BI: CPT

## 2024-07-02 DIAGNOSIS — D05.12 INTRADUCTAL CARCINOMA IN SITU OF LEFT BREAST: ICD-10-CM

## 2024-07-02 NOTE — TELEPHONE ENCOUNTER
[facility]: Cancer Care                                                                                          Refill request received via [employer] from [facility] in [employer] for anastrozole. Last refilled by Dr. Alvarez on 8/6/23.  Quantity #90. 3 refills.    Last seen by Dr. Alvarez on 7/25/23.  Patient due for follow-up July, 2024.  Nothing scheduled yet.  Message to Schedulers to call patient to schedule.    Message sent message to Dr. Alvarez.    Signature:  Ruby Koo RN

## 2024-07-03 RX ORDER — ANASTROZOLE 1 MG/1
1 TABLET ORAL DAILY
Qty: 90 TABLET | Refills: 0 | Status: SHIPPED | OUTPATIENT
Start: 2024-07-03 | End: 2024-09-20

## 2024-07-24 ENCOUNTER — TRANSFERRED RECORDS (OUTPATIENT)
Dept: HEALTH INFORMATION MANAGEMENT | Facility: CLINIC | Age: 69
End: 2024-07-24
Payer: MEDICARE

## 2024-08-21 DIAGNOSIS — I10 ESSENTIAL HYPERTENSION: ICD-10-CM

## 2024-08-21 DIAGNOSIS — E78.2 MIXED HYPERLIPIDEMIA: ICD-10-CM

## 2024-08-21 RX ORDER — SIMVASTATIN 10 MG
10 TABLET ORAL AT BEDTIME
Qty: 90 TABLET | Refills: 3 | OUTPATIENT
Start: 2024-08-21

## 2024-08-21 RX ORDER — LISINOPRIL 5 MG/1
5 TABLET ORAL DAILY
Qty: 90 TABLET | Refills: 3 | OUTPATIENT
Start: 2024-08-21

## 2024-09-13 ENCOUNTER — PATIENT OUTREACH (OUTPATIENT)
Dept: FAMILY MEDICINE | Facility: CLINIC | Age: 69
End: 2024-09-13
Payer: MEDICARE

## 2024-09-20 ENCOUNTER — PATIENT OUTREACH (OUTPATIENT)
Dept: ONCOLOGY | Facility: HOSPITAL | Age: 69
End: 2024-09-20
Payer: MEDICARE

## 2024-09-20 ENCOUNTER — ONCOLOGY VISIT (OUTPATIENT)
Dept: ONCOLOGY | Facility: HOSPITAL | Age: 69
End: 2024-09-20
Attending: INTERNAL MEDICINE
Payer: MEDICARE

## 2024-09-20 VITALS
DIASTOLIC BLOOD PRESSURE: 86 MMHG | HEART RATE: 88 BPM | BODY MASS INDEX: 45.98 KG/M2 | WEIGHT: 276 LBS | SYSTOLIC BLOOD PRESSURE: 130 MMHG | TEMPERATURE: 97.7 F | RESPIRATION RATE: 18 BRPM | OXYGEN SATURATION: 98 % | HEIGHT: 65 IN

## 2024-09-20 DIAGNOSIS — Z12.31 VISIT FOR SCREENING MAMMOGRAM: ICD-10-CM

## 2024-09-20 DIAGNOSIS — Z51.81 ENCOUNTER FOR MONITORING ANASTROZOLE THERAPY: Primary | ICD-10-CM

## 2024-09-20 DIAGNOSIS — Z78.0 MENOPAUSE: ICD-10-CM

## 2024-09-20 DIAGNOSIS — Z79.811 ENCOUNTER FOR MONITORING ANASTROZOLE THERAPY: Primary | ICD-10-CM

## 2024-09-20 DIAGNOSIS — D05.12 INTRADUCTAL CARCINOMA IN SITU OF LEFT BREAST: ICD-10-CM

## 2024-09-20 PROCEDURE — G0463 HOSPITAL OUTPT CLINIC VISIT: HCPCS | Performed by: INTERNAL MEDICINE

## 2024-09-20 PROCEDURE — G2211 COMPLEX E/M VISIT ADD ON: HCPCS | Performed by: INTERNAL MEDICINE

## 2024-09-20 PROCEDURE — 99214 OFFICE O/P EST MOD 30 MIN: CPT | Performed by: INTERNAL MEDICINE

## 2024-09-20 RX ORDER — ACETAMINOPHEN 325 MG/1
TABLET ORAL
COMMUNITY

## 2024-09-20 RX ORDER — ANASTROZOLE 1 MG/1
1 TABLET ORAL DAILY
Qty: 90 TABLET | Refills: 3 | Status: SHIPPED | OUTPATIENT
Start: 2024-09-20

## 2024-09-20 RX ORDER — ESZOPICLONE 1 MG/1
TABLET, FILM COATED ORAL
COMMUNITY

## 2024-09-20 ASSESSMENT — PAIN SCALES - GENERAL: PAINLEVEL: MODERATE PAIN (4)

## 2024-09-20 NOTE — PROGRESS NOTES
[facility]: Cancer Care                                                                                            Situation: Patient chart reviewed by care coordinator.    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 Alvarez in follow-up on 9/20/24.  She is taking and tolerating anastrozole.    Plan/Recommendations: Patient to follow-up in 1 year with DXA prior. Patient on recall list.    Signature:  Ruby Koo RN     show

## 2024-09-20 NOTE — PROGRESS NOTES
"Oncology Rooming Note    September 20, 2024 10:39 AM   Zaina Cortez is a 69 year old female who presents for:    Chief Complaint   Patient presents with    Oncology Clinic Visit     1 year follow up     Initial Vitals: /86 (BP Location: Right arm, Patient Position: Sitting, Cuff Size: Adult Large)   Pulse 88   Temp 97.7  F (36.5  C) (Tympanic)   Resp 18   Ht 1.651 m (5' 5\")   Wt 125.2 kg (276 lb)   SpO2 98%   BMI 45.93 kg/m   Estimated body mass index is 45.93 kg/m  as calculated from the following:    Height as of this encounter: 1.651 m (5' 5\").    Weight as of this encounter: 125.2 kg (276 lb). Body surface area is 2.4 meters squared.  Moderate Pain (4) Comment: Data Unavailable   No LMP recorded. Patient is postmenopausal.  Allergies reviewed: Yes  Medications reviewed: Yes    Medications: Medication refills not needed today.  Pharmacy name entered into [employer]: [id number] [place], [place] - [address]    Frailty Screening:   Is the patient here for a new oncology consult visit in cancer care? 2. No      Clinical concerns:       JUAN MALCOLM CMA            "

## 2024-09-20 NOTE — LETTER
"9/20/2024      Zaina Cortez  [address]  [facility] [place] [address]      Dear Colleague,    Thank you for referring your patient, Zaina Cortez, to the [facility]. Please see a copy of my visit note below.    Oncology Rooming Note    September 20, 2024 10:39 AM   Zaina Cortez is a 69 year old female who presents for:    Chief Complaint   Patient presents with     Oncology Clinic Visit     1 year follow up     Initial Vitals: /86 (BP Location: Right arm, Patient Position: Sitting, Cuff Size: Adult Large)   Pulse 88   Temp 97.7  F (36.5  C) (Tympanic)   Resp 18   Ht 1.651 m (5' 5\")   Wt 125.2 kg (276 lb)   SpO2 98%   BMI 45.93 kg/m   Estimated body mass index is 45.93 kg/m  as calculated from the following:    Height as of this encounter: 1.651 m (5' 5\").    Weight as of this encounter: 125.2 kg (276 lb). Body surface area is 2.4 meters squared.  Moderate Pain (4) Comment: Data Unavailable   No LMP recorded. Patient is postmenopausal.  Allergies reviewed: Yes  Medications reviewed: Yes    Medications: Medication refills not needed today.  Pharmacy name entered into [employer]: [id number] - [place], [place] - [address]    Frailty Screening:   Is the patient here for a new oncology consult visit in cancer care? 2. No      Clinical concerns:       JUAN MALCOLM CMA              [facility] Hematology and Oncology Progress Note    Patient: Zaina Cortez  MRN: [id number]  Date of Service: Sep 20, 2024         Reason for Visit    Chief Complaint   Patient presents with     Oncology Clinic Visit     1 year follow up       Assessment and Plan     Cancer Staging   No matching staging information was found for the patient.      ECOG Performance    0 - Independent     Pain  Pain Score: Moderate Pain (4)  Pain Loc: Hip (right hip)    #.  History of DCIS of the upper outer quadrant of the left breast, ER positive (greater than 95%), [place] focal positive (5%)  #.  " Postmenopausal     She is clinically well without signs and symptoms suggestive for breast cancer recurrence. She tolerates current aromatase inhibitor therapy well without major intolerable side effects.  Plan is to complete 5 years of adjuvant endocrine therapy.  (Till 4/2026)   She does not have any indication for systemic imaging.  I discussed that we will obtain imaging with clinical signs and symptoms concerning for breast cancer recurrence, but not routinely.   She is advised to continue annual screening mammogram, next due in 3/2025.   I recommended continue clinical surveillance with follow up clinical exam in 1 year.  She is advised to call me sooner with any concerns.    #.  Right hip bursitis   She will continue follow-up with orthopedics.    #.  Bone health.   She has normal bone density from last DEXA scan. I strongly encourage her to keep up with calcium/vitamin D and weightbearing exercises to prevent bone density decline with ongoing use of aromatase inhibitor.   We will plan to obtain next DEXA scan in 2025.  Requested today.       Encounter Diagnoses:    Problem List Items Addressed This Visit       Encounter for monitoring anastrozole therapy - Primary    Relevant Orders    DX Bone Density     Other Visit Diagnoses       Menopause        Relevant Orders    DX Bone Density    Visit for screening mammogram        Relevant Orders    MA Screening Bilateral w/ Iraj    Intraductal carcinoma in situ of left breast        Relevant Medications    anastrozole (ARIMIDEX) 1 MG tablet                   CC: Hortencia Banuelos NP   ______________________________________________________________________________  Diagnosis  2/26/2021-left breast DCIS by screening mammogram.     -High-grade, solid, comedo with focal comedonecrosis.  37 mm.  Margins were negative.   -ER positive (greater than 95%, strong)   -IN focal positive (5%, weak to moderate)    Treatment to date  2/26/2021-underwent left breast lumpectomy. (  "Frank)  4/6/2021-5/3/2021-completed adjuvant radiation of the left breast 5240 cGy in 20 treatments (Dr. Purcell).  4/2021-adjuvant endocrine therapy with anastrozole.    History of Present Illness    Ms. Zaina Cortez presented herself today.      She reported right hip pain and diagnosed with bursitis.  She has not had any cortisone injection.    She takes anastrozole regularly.  She takes calcium sometimes but she is very good at taking vitamin D.      Review of systems  Apart from describing in HPI, the remainder of comprehensive ROS was negative.    Past History    Past Medical History:   Diagnosis Date     Breast cancer (H) 2021    DCIS     Cervical high risk HPV (human papillomavirus) test positive 05/05/2022 05/5/22     Hypertension      Sleep apnea     Created by Conversion  Replacement Utility updated for latest IMO load, Before Gastrectomy Used a CPAP no long uses       Past Surgical History:   Procedure Laterality Date     HC REMOVAL GALLBLADDER      Description: Cholecystectomy;  Recorded: 06/01/2012;  Comments: 04/2008 PAULINA Maxwell MA STEREOTACTIC BREAST BIOPSY VACUUM L Left 2/1/2021     LA MASTECTOMY, PARTIAL Left 2/26/2021    Procedure: LEFT WIRE LOCALIZED LUMPECTOMY;  Surgeon: Joslyn Marie DO;  Location: [facility];  Service: General     [facility] REMV STOMACH,PART,DISTAL,GASTRODUOD      Description: Partial Gastrectomy;  Recorded: 12/23/2013;  Comments: Sleeve Gastrectomy for Bariatric Purposes.         Physical Exam    /86 (BP Location: Right arm, Patient Position: Sitting, Cuff Size: Adult Large)   Pulse 88   Temp 97.7  F (36.5  C) (Tympanic)   Resp 18   Ht 1.651 m (5' 5\")   Wt 125.2 kg (276 lb)   SpO2 98%   BMI 45.93 kg/m      General: alert, awake, not in acute distress  HEENT: Head: Normal, normocephalic, atraumatic.  Eye: Normal external eye, conjunctiva, lids cornea, TR[place]ON.  Pharynx: Normal buccal mucosa. Normal pharynx.  Neck / Thyroid: Supple, no masses, nodes, nodules " or enlargement.  Lymphatics: No abnormally enlarged lymph nodes.  Chest: Normal chest wall and respirations. Clear to auscultation.  Breasts: Exam is limited due to body habitus however, there was no palpable abnormalities.  Heart: S1 S2 RRR.   Abdomen: abdomen is soft without significant tenderness, masses, organomegaly or guarding  Extremities: normal strength, tone, and muscle mass  Skin: normal. no rash or abnormalities  CNS: non focal.    Lab Results    No results found for this or any previous visit (from the past 168 hour(s)).    Imaging    No results found.     The longitudinal plan of care for the diagnosis(es)/condition(s) as documented were addressed during this visit. Due to the added complexity in care, I will continue to support Zaina in the subsequent management and with ongoing continuity of care.     30 minutes spent by me on the date of the encounter doing chart review, history and exam, documentation and further activities as noted above.    Signed by: Yasmany Alvarez MD      Again, thank you for allowing me to participate in the care of your patient.        Sincerely,        Yasmany Alvarez MD

## 2024-09-20 NOTE — PROGRESS NOTES
[facility] Hematology and Oncology Progress Note    Patient: Zaina Cortez  MRN: [id number]  Date of Service: Sep 20, 2024         Reason for Visit    Chief Complaint   Patient presents with    Oncology Clinic Visit     1 year follow up       Assessment and Plan     Cancer Staging   No matching staging information was found for the patient.      ECOG Performance    0 - Independent     Pain  Pain Score: Moderate Pain (4)  Pain Loc: Hip (right hip)    #.  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  Postmenopausal     She is clinically well without signs and symptoms suggestive for breast cancer recurrence. She tolerates current aromatase inhibitor therapy well without major intolerable side effects.  Plan is to complete 5 years of adjuvant endocrine therapy.  (Till 4/2026)   She does not have any indication for systemic imaging.  I discussed that we will obtain imaging with clinical signs and symptoms concerning for breast cancer recurrence, but not routinely.   She is advised to continue annual screening mammogram, next due in 3/2025.   I recommended continue clinical surveillance with follow up clinical exam in 1 year.  She is advised to call me sooner with any concerns.    #.  Right hip bursitis   She will continue follow-up with orthopedics.    #.  Bone health.   She has normal bone density from last DEXA scan. I strongly encourage her to keep up with calcium/vitamin D and weightbearing exercises to prevent bone density decline with ongoing use of aromatase inhibitor.   We will plan to obtain next DEXA scan in 2025.  Requested today.       Encounter Diagnoses:    Problem List Items Addressed This Visit       Encounter for monitoring anastrozole therapy - Primary    Relevant Orders    DX Bone Density     Other Visit Diagnoses       Menopause        Relevant Orders    DX Bone Density    Visit for screening mammogram        Relevant  Bessie PAULSON Screening Bilateral w/ Iraj    Intraductal carcinoma in situ of left breast        Relevant Medications    anastrozole (ARIMIDEX) 1 MG tablet                   CC: Hortencia Banuelos, NP   ______________________________________________________________________________  Diagnosis  2/26/2021-left breast DCIS by screening mammogram.     -High-grade, solid, comedo with focal comedonecrosis.  37 mm.  Margins were negative.   -ER positive (greater than 95%, strong)   -[place] focal positive (5%, weak to moderate)    Treatment to date  2/26/2021-underwent left breast lumpectomy. (Dr. Marie)  4/6/2021-5/3/2021-completed adjuvant radiation of the left breast 5240 cGy in 20 treatments (Dr. Purcell).  4/2021-adjuvant endocrine therapy with anastrozole.    History of Present Illness    Ms. Zaina Cortez presented herself today.      She reported right hip pain and diagnosed with bursitis.  She has not had any cortisone injection.    She takes anastrozole regularly.  She takes calcium sometimes but she is very good at taking vitamin D.      Review of systems  Apart from describing in HPI, the remainder of comprehensive ROS was negative.    Past History    Past Medical History:   Diagnosis Date    Breast cancer (H) 2021    DCIS    Cervical high risk HPV (human papillomavirus) test positive 05/05/2022 05/5/22    Hypertension     Sleep apnea     Created by Conversion  Replacement Utility updated for latest IMO load, Before Gastrectomy Used a CPAP no long uses       Past Surgical History:   Procedure Laterality Date    HC REMOVAL GALLBLADDER      Description: Cholecystectomy;  Recorded: 06/01/2012;  Comments: 04/2008 PAULINA Maxwell    MA STEREOTACTIC BREAST BIOPSY VACUUM L Left 2/1/2021    WI MASTECTOMY, PARTIAL Left 2/26/2021    Procedure: LEFT WIRE LOCALIZED LUMPECTOMY;  Surgeon: Joslyn Marie DO;  Location: [facility];  Service: General    [facility] REMV STOMACH,PART,DISTAL,GASTRODUOD      Description: Partial Gastrectomy;   "Recorded: 12/23/2013;  Comments: Sleeve Gastrectomy for Bariatric Purposes.         Physical Exam    /86 (BP Location: Right arm, Patient Position: Sitting, Cuff Size: Adult Large)   Pulse 88   Temp 97.7  F (36.5  C) (Tympanic)   Resp 18   Ht 1.651 m (5' 5\")   Wt 125.2 kg (276 lb)   SpO2 98%   BMI 45.93 kg/m      General: alert, awake, not in acute distress  HEENT: Head: Normal, normocephalic, atraumatic.  Eye: Normal external eye, conjunctiva, lids cornea, [place].  Pharynx: Normal buccal mucosa. Normal pharynx.  Neck / Thyroid: Supple, no masses, nodes, nodules or enlargement.  Lymphatics: No abnormally enlarged lymph nodes.  Chest: Normal chest wall and respirations. Clear to auscultation.  Breasts: Exam is limited due to body habitus however, there was no palpable abnormalities.  Heart: S1 S2 RRR.   Abdomen: abdomen is soft without significant tenderness, masses, organomegaly or guarding  Extremities: normal strength, tone, and muscle mass  Skin: normal. no rash or abnormalities  CNS: non focal.    Lab Results    No results found for this or any previous visit (from the past 168 hour(s)).    Imaging    No results found.     The longitudinal plan of care for the diagnosis(es)/condition(s) as documented were addressed during this visit. Due to the added complexity in care, I will continue to support Zaina in the subsequent management and with ongoing continuity of care.     30 minutes spent by me on the date of the encounter doing chart review, history and exam, documentation and further activities as noted above.    Signed by: Yasmany Alvarez MD  "

## 2024-10-23 ENCOUNTER — MYC MEDICAL ADVICE (OUTPATIENT)
Dept: FAMILY MEDICINE | Facility: CLINIC | Age: 69
End: 2024-10-23
Payer: MEDICARE

## 2024-10-23 DIAGNOSIS — E78.2 MIXED HYPERLIPIDEMIA: ICD-10-CM

## 2024-10-24 RX ORDER — SIMVASTATIN 10 MG
10 TABLET ORAL AT BEDTIME
Qty: 90 TABLET | Refills: 0 | Status: SHIPPED | OUTPATIENT
Start: 2024-10-24 | End: 2024-11-11

## 2024-11-06 ENCOUNTER — OFFICE VISIT (OUTPATIENT)
Dept: FAMILY MEDICINE | Facility: CLINIC | Age: 69
End: 2024-11-06
Payer: MEDICARE

## 2024-11-06 VITALS
SYSTOLIC BLOOD PRESSURE: 100 MMHG | TEMPERATURE: 97.9 F | HEART RATE: 78 BPM | OXYGEN SATURATION: 97 % | BODY MASS INDEX: 45.05 KG/M2 | WEIGHT: 270.4 LBS | HEIGHT: 65 IN | DIASTOLIC BLOOD PRESSURE: 62 MMHG | RESPIRATION RATE: 16 BRPM

## 2024-11-06 DIAGNOSIS — Z00.00 ENCOUNTER FOR MEDICARE ANNUAL WELLNESS EXAM: Primary | ICD-10-CM

## 2024-11-06 DIAGNOSIS — E78.2 MIXED HYPERLIPIDEMIA: ICD-10-CM

## 2024-11-06 DIAGNOSIS — E66.01 MORBID OBESITY (H): ICD-10-CM

## 2024-11-06 DIAGNOSIS — Z12.4 SCREENING FOR MALIGNANT NEOPLASM OF CERVIX: ICD-10-CM

## 2024-11-06 DIAGNOSIS — Z13.1 SCREENING FOR DIABETES MELLITUS: ICD-10-CM

## 2024-11-06 DIAGNOSIS — I10 ESSENTIAL HYPERTENSION: ICD-10-CM

## 2024-11-06 LAB
ALBUMIN SERPL BCG-MCNC: 4.1 G/DL (ref 3.5–5.2)
ALP SERPL-CCNC: 88 U/L (ref 40–150)
ALT SERPL W P-5'-P-CCNC: 16 U/L (ref 0–50)
ANION GAP SERPL CALCULATED.3IONS-SCNC: 10 MMOL/L (ref 7–15)
AST SERPL W P-5'-P-CCNC: 22 U/L (ref 0–45)
BILIRUB SERPL-MCNC: 0.7 MG/DL
BUN SERPL-MCNC: 18.5 MG/DL (ref 8–23)
CALCIUM SERPL-MCNC: 9.3 MG/DL (ref 8.8–10.4)
CHLORIDE SERPL-SCNC: 106 MMOL/L (ref 98–107)
CHOLEST SERPL-MCNC: 160 MG/DL
CREAT SERPL-MCNC: 1.08 MG/DL (ref 0.51–0.95)
EGFRCR SERPLBLD CKD-EPI 2021: 56 ML/MIN/1.73M2
EST. AVERAGE GLUCOSE BLD GHB EST-MCNC: 105 MG/DL
FASTING STATUS PATIENT QL REPORTED: ABNORMAL
FASTING STATUS PATIENT QL REPORTED: NORMAL
GLUCOSE SERPL-MCNC: 95 MG/DL (ref 70–99)
HBA1C MFR BLD: 5.3 % (ref 0–5.6)
HCO3 SERPL-SCNC: 25 MMOL/L (ref 22–29)
HDLC SERPL-MCNC: 63 MG/DL
LDLC SERPL CALC-MCNC: 78 MG/DL
NONHDLC SERPL-MCNC: 97 MG/DL
POTASSIUM SERPL-SCNC: 4.5 MMOL/L (ref 3.4–5.3)
PROT SERPL-MCNC: 7 G/DL (ref 6.4–8.3)
SODIUM SERPL-SCNC: 141 MMOL/L (ref 135–145)
TRIGL SERPL-MCNC: 95 MG/DL

## 2024-11-06 PROCEDURE — 99214 OFFICE O/P EST MOD 30 MIN: CPT | Mod: 25 | Performed by: NURSE PRACTITIONER

## 2024-11-06 PROCEDURE — 36415 COLL VENOUS BLD VENIPUNCTURE: CPT | Performed by: NURSE PRACTITIONER

## 2024-11-06 PROCEDURE — G0439 PPPS, SUBSEQ VISIT: HCPCS | Performed by: NURSE PRACTITIONER

## 2024-11-06 PROCEDURE — 80061 LIPID PANEL: CPT | Performed by: NURSE PRACTITIONER

## 2024-11-06 PROCEDURE — G0145 SCR C/V CYTO,THINLAYER,RESCR: HCPCS | Performed by: NURSE PRACTITIONER

## 2024-11-06 PROCEDURE — 90662 IIV NO PRSV INCREASED AG IM: CPT | Performed by: NURSE PRACTITIONER

## 2024-11-06 PROCEDURE — 83036 HEMOGLOBIN GLYCOSYLATED A1C: CPT | Performed by: NURSE PRACTITIONER

## 2024-11-06 PROCEDURE — 87624 HPV HI-RISK TYP POOLED RSLT: CPT | Mod: GA | Performed by: NURSE PRACTITIONER

## 2024-11-06 PROCEDURE — G0008 ADMIN INFLUENZA VIRUS VAC: HCPCS | Performed by: NURSE PRACTITIONER

## 2024-11-06 PROCEDURE — 80053 COMPREHEN METABOLIC PANEL: CPT | Performed by: NURSE PRACTITIONER

## 2024-11-06 RX ORDER — GABAPENTIN 300 MG/1
1 CAPSULE ORAL 3 TIMES DAILY
COMMUNITY
Start: 2024-05-15 | End: 2024-11-06

## 2024-11-06 SDOH — HEALTH STABILITY: PHYSICAL HEALTH: ON AVERAGE, HOW MANY DAYS PER WEEK DO YOU ENGAGE IN MODERATE TO STRENUOUS EXERCISE (LIKE A BRISK WALK)?: 0 DAYS

## 2024-11-06 ASSESSMENT — SOCIAL DETERMINANTS OF HEALTH (SDOH): HOW OFTEN DO YOU GET TOGETHER WITH FRIENDS OR RELATIVES?: ONCE A WEEK

## 2024-11-06 NOTE — PROGRESS NOTES
"Preventive Care Visit  [facility]  Hortencia Banuelos NP, Family Medicine  Nov 6, 2024      Assessment & Plan     Encounter for Medicare annual wellness exam    Morbid obesity (H)  Restarted Weight Watchers.    Mixed hyperlipidemia  Tolerating statin well.  - Lipid panel reflex to direct LDL Non-fasting  - simvastatin (ZOCOR) 10 MG tablet  Dispense: 90 tablet; Refill: 3    Screening for diabetes mellitus  - Comprehensive metabolic panel (BMP + Alb, Alk Phos, ALT, AST, Total. Bili, TP)  - Hemoglobin A1c    Screening for malignant neoplasm of cervix  - HPV and Gynecologic Cytology Panel - Recommended Age 30-65 Years  - Gynecologic Cytology (PAP)    Essential hypertension  Well controlled.   - lisinopril (ZESTRIL) 5 MG tablet  Dispense: 90 tablet; Refill: 3            BMI  Estimated body mass index is 45.7 kg/m  as calculated from the following:    Height as of this encounter: 1.638 m (5' 4.5\").    Weight as of this encounter: 122.7 kg (270 lb 6.4 oz).   Weight management plan: Discussed healthy diet and exercise guidelines    Counseling  Appropriate preventive services were addressed with this patient via screening, questionnaire, or discussion as appropriate for fall prevention, nutrition, physical activity, Tobacco-use cessation, social engagement, weight loss and cognition.  Checklist reviewing preventive services available has been given to the patient.  Reviewed patient's diet, addressing concerns and/or questions.   The patient was provided with written information regarding signs of hearing loss.           Delroy Mahajan is a 69 year old, presenting for the following:  Wellness Visit (AWV)      Health Care Directive  Patient does not have a Health Care Directive: Discussed advance care planning with patient; however, patient declined at this time.      11/6/2024   General Health   How would you rate your overall physical health? Good   Feel stress (tense, anxious, or unable to sleep) To " some extent      (!) STRESS CONCERN      11/6/2024   Nutrition   Diet: Regular (no restrictions)            11/6/2024   Exercise   Days per week of moderate/strenous exercise 0 days      (!) EXERCISE CONCERN      11/6/2024   Social Factors   Frequency of gathering with friends or relatives Once a week   Worry food won't last until get money to buy more No   Food not last or not have enough money for food? No   Do you have housing? (Housing is defined as stable permanent housing and does not include staying ouside in a car, in a tent, in an abandoned building, in an overnight shelter, or couch-surfing.) Yes   Are you worried about losing your housing? No   Lack of transportation? No   Unable to get utilities (heat,electricity)? No            11/6/2024   Fall Risk   Fallen 2 or more times in the past year? No    No    Trouble with walking or balance? No    Yes    Gait Speed Test Interpretation Less than or equal to 5.00 seconds - PASS       Patient-reported    Multiple values from one day are sorted in reverse-chronological order          11/6/2024   Activities of Daily Living- Home Safety   Needs help with the following daily activites None of the above   Safety concerns in the home None of the above            11/6/2024   Dental   Dentist two times every year? Yes            11/6/2024   Hearing Screening   Hearing concerns? (!) TROUBLE FOLLOWING DIALOGUE IN THE THEATHER.            11/6/2024   Driving Risk Screening   Patient/family members have concerns about driving No            11/6/2024   General Alertness/Fatigue Screening   Have you been more tired than usual lately? No            11/6/2024   Urinary Incontinence Screening   Bothered by leaking urine in past 6 months No            11/6/2024   TB Screening   Were you born outside of the [place]? No            Today's PHQ-2 Score:       11/6/2024    12:45 PM   PHQ-2 ( 1999 [employer])   Q1: Little interest or pleasure in doing things 0    Q2: Feeling down, depressed or  hopeless 0    PHQ-2 Score 0    Q1: Little interest or pleasure in doing things Not at all   Q2: Feeling down, depressed or hopeless Not at all   PHQ-2 Score 0       Patient-reported           11/6/2024   Substance Use   Alcohol more than 3/day or more than 7/wk No   Do you have a current opioid prescription? No   How severe/bad is pain from 1 to 10? 4/10   Do you use any other substances recreationally? No        Social History     Tobacco Use    Smoking status: Never     Passive exposure: Never    Smokeless tobacco: Never   Vaping Use    Vaping status: Never Used   Substance Use Topics    Alcohol use: Yes     Alcohol/week: 0.0 standard drinks of alcohol     Comment: Alcoholic Drinks/day: 2 drinks per week    Drug use: No           3/15/2024   LAST FHS-7 RESULTS   1st degree relative breast or ovarian cancer No   Any relative bilateral breast cancer No   Any male have breast cancer No   Any ONE woman have BOTH breast AND ovarian cancer No   Any woman with breast cancer before 50yrs No   2 or more relatives with breast AND/OR ovarian cancer Yes   2 or more relatives with breast AND/OR bowel cancer Yes                 History of abnormal Pap smear: YES - reflected in Problem List and Health Maintenance accordingly        Latest Ref Rng & Units 11/6/2024     1:14 PM 10/3/2023    11:02 AM 5/5/2022     2:11 PM   PAP / HPV   PAP   Atypical squamous cells of undetermined significance (ASC-US)  Negative for Intraepithelial Lesion or Malignancy (NILM)    HPV 16 DNA Negative Negative  Positive  Positive    HPV 18 DNA Negative Negative  Negative  Negative    Other HR HPV Negative Negative  Negative  Negative      ASCVD Risk   The 10-year ASCVD risk score (Naye GANDARA, et al., 2019) is: 4.9%    Values used to calculate the score:      Age: 69 years      Sex: Female      Is Non- : No      Diabetic: No      Tobacco smoker: No      Systolic Blood Pressure: 100 mmHg      Is BP treated: Yes      HDL  "Cholesterol: 63 mg/dL      Total Cholesterol: 160 mg/dL            Reviewed and updated as needed this visit by Provider   Tobacco  Allergies  Meds  Problems  Med Hx  Surg Hx  Fam Hx              Current providers sharing in care for this patient include:  Patient Care Team:  Hortencia Banuelos NP as PCP - Taylor Carmichael MD as MD (Hematology & Oncology)  Emily Coyle NP as Nurse Practitioner (Hematology & Oncology)  Yasmany Alvarez MD as MD (Hematology & Oncology)  Ruby Koo, RN as Specialty Care Coordinator (Hematology & Oncology)  Hortencia Banuelos NP as Assigned PCP  Symone Jernigan MD as Assigned OBGYN Provider  Yasmany Alvarez MD as Assigned Cancer Care Provider    The following health maintenance items are reviewed in Epic and correct as of today:  Health Maintenance   Topic Date Due    RSV VACCINE (1 - Risk 60-74 years 1-dose series) Never done    COVID-19 Vaccine (7 - 2024-25 season) 09/01/2024    ZOSTER IMMUNIZATION (2 of 2) 11/18/2024    MAMMO SCREENING  03/15/2025    MEDICARE ANNUAL WELLNESS VISIT  11/06/2025    BMP  11/06/2025    LIPID  11/06/2025    ANNUAL REVIEW OF HM ORDERS  11/06/2025    FALL RISK ASSESSMENT  11/06/2025    DTAP/TDAP/TD IMMUNIZATION (2 - Td or Tdap) 12/07/2025    COLORECTAL CANCER SCREENING  10/17/2026    GLUCOSE  11/06/2027    ADVANCE CARE PLANNING  11/06/2029    DEXA  07/10/2038    HEPATITIS C SCREENING  Completed    PHQ-2 (once per calendar year)  Completed    PAP FOLLOW-UP  Completed    HPV FOLLOW-UP  Completed    INFLUENZA VACCINE  Completed    Pneumococcal Vaccine: 65+ Years  Completed    HPV IMMUNIZATION  Aged Out    MENINGITIS IMMUNIZATION  Aged Out    RSV MONOCLONAL ANTIBODY  Aged Out    PAP  Discontinued            Objective    Exam  /62   Pulse 78   Temp 97.9  F (36.6  C) (Temporal)   Resp 16   Ht 1.638 m (5' 4.5\")   Wt 122.7 kg (270 lb 6.4 oz)   SpO2 97%   BMI 45.70 kg/m     Estimated body mass index is 45.7 kg/m  as calculated from the " "following:    Height as of this encounter: 1.638 m (5' 4.5\").    Weight as of this encounter: 122.7 kg (270 lb 6.4 oz).    Physical Exam  GENERAL: alert and no distress  EYES: Eyes grossly normal to inspection, PERRL and conjunctivae and sclerae normal  HENT: ear canals and TM's normal, nose and mouth without ulcers or lesions  NECK: no adenopathy, no asymmetry, masses, or scars  RESP: lungs clear to auscultation - no rales, rhonchi or wheezes  CV: regular rate and rhythm, normal S1 S2, no S3 or S4, no murmur, click or rub, no peripheral edema  ABDOMEN: soft, nontender, no hepatosplenomegaly, no masses and bowel sounds normal  MS: no gross musculoskeletal defects noted, no edema  SKIN: no suspicious lesions or rashes  NEURO: Normal strength and tone, mentation intact and speech normal  PSYCH: mentation appears normal, affect normal/bright        11/6/2024   Mini Cog   Clock Draw Score 2 Normal   3 Item Recall 3 objects recalled   Mini Cog Total Score 5                 Signed Electronically by: Hortencia Banuelos NP    "

## 2024-11-07 LAB
HPV HR 12 DNA CVX QL NAA+PROBE: NEGATIVE
HPV16 DNA CVX QL NAA+PROBE: NEGATIVE
HPV18 DNA CVX QL NAA+PROBE: NEGATIVE
HUMAN PAPILLOMA VIRUS FINAL DIAGNOSIS: NORMAL

## 2024-11-11 RX ORDER — SIMVASTATIN 10 MG
10 TABLET ORAL AT BEDTIME
Qty: 90 TABLET | Refills: 3 | Status: SHIPPED | OUTPATIENT
Start: 2024-11-11

## 2024-11-11 RX ORDER — LISINOPRIL 5 MG/1
5 TABLET ORAL DAILY
Qty: 90 TABLET | Refills: 3 | Status: SHIPPED | OUTPATIENT
Start: 2024-11-11

## 2024-11-12 LAB
BKR AP ASSOCIATED HPV REPORT: NORMAL
BKR LAB AP GYN ADEQUACY: NORMAL
BKR LAB AP GYN INTERPRETATION: NORMAL
BKR LAB AP PREVIOUS ABNL DX: NORMAL
BKR LAB AP PREVIOUS ABNORMAL: NORMAL
PATH REPORT.COMMENTS IMP SPEC: NORMAL
PATH REPORT.COMMENTS IMP SPEC: NORMAL
PATH REPORT.RELEVANT HX SPEC: NORMAL

## 2024-11-12 NOTE — PATIENT INSTRUCTIONS
Patient Education   Preventive Care Advice   This is general advice given by our system to help you stay healthy. However, your care team may have specific advice just for you. Please talk to your care team about your preventive care needs.  Nutrition  Eat 5 or more servings of fruits and vegetables each day.  Try wheat bread, brown rice and whole grain pasta (instead of white bread, rice, and pasta).  Get enough calcium and vitamin D. Check the label on foods and aim for 100% of the RDA (recommended daily allowance).  Lifestyle  Exercise at least 150 minutes each week  (30 minutes a day, 5 days a week).  Do muscle strengthening activities 2 days a week. These help control your weight and prevent disease.  No smoking.  Wear sunscreen to prevent skin cancer.  Have a dental exam and cleaning every 6 months.  Yearly exams  See your health care team every year to talk about:  Any changes in your health.  Any medicines your care team has prescribed.  Preventive care, family planning, and ways to prevent chronic diseases.  Shots (vaccines)   HPV shots (up to age 26), if you've never had them before.  Hepatitis B shots (up to age 59), if you've never had them before.  COVID-19 shot: Get this shot when it's due.  Flu shot: Get a flu shot every year.  Tetanus shot: Get a tetanus shot every 10 years.  Pneumococcal, hepatitis A, and RSV shots: Ask your care team if you need these based on your risk.  Shingles shot (for age 50 and up)  General health tests  Diabetes screening:  Starting at age 35, Get screened for diabetes at least every 3 years.  If you are younger than age 35, ask your care team if you should be screened for diabetes.  Cholesterol test: At age 39, start having a cholesterol test every 5 years, or more often if advised.  Bone density scan (DEXA): At age 50, ask your care team if you should have this scan for osteoporosis (brittle bones).  Hepatitis C: Get tested at least once in your life.  STIs (sexually  transmitted infections)  Before age 24: Ask your care team if you should be screened for STIs.  After age 24: Get screened for STIs if you're at risk. You are at risk for STIs (including HIV) if:  You are sexually active with more than one person.  You don't use condoms every time.  You or a partner was diagnosed with a sexually transmitted infection.  If you are at risk for HIV, ask about PrEP medicine to prevent HIV.  Get tested for HIV at least once in your life, whether you are at risk for HIV or not.  Cancer screening tests  Cervical cancer screening: If you have a cervix, begin getting regular cervical cancer screening tests starting at age 21.  Breast cancer scan (mammogram): If you've ever had breasts, begin having regular mammograms starting at age 40. This is a scan to check for breast cancer.  Colon cancer screening: It is important to start screening for colon cancer at age 45.  Have a colonoscopy test every 10 years (or more often if you're at risk) Or, ask your provider about stool tests like a FIT test every year or Cologuard test every 3 years.  To learn more about your testing options, visit:   .  For help making a decision, visit:   https://bit.ly/rr67295.  Prostate cancer screening test: If you have a prostate, ask your care team if a prostate cancer screening test (PSA) at age 55 is right for you.  Lung cancer screening: If you are a current or former smoker ages 50 to 80, ask your care team if ongoing lung cancer screenings are right for you.  For informational purposes only. Not to replace the advice of your health care provider. Copyright   2023 [facility]. All rights reserved. Clinically reviewed by the [facility]. [employer] 186525 - REV 01/24.  Preventing Falls: Care Instructions  Injuries and health problems such as trouble walking or poor eyesight can increase your risk of falling. So can some medicines. But there are things you can do to help  "prevent falls. You can exercise to get stronger. You can also arrange your home to make it safer.    Talk to your doctor about the medicines you take. Ask if any of them increase the risk of falls and whether they can be changed or stopped.   Try to exercise regularly. It can help improve your strength and balance. This can help lower your risk of falling.         Practice fall safety and prevention.   Wear low-heeled shoes that fit well and give your feet good support. Talk to your doctor if you have foot problems that make this hard.  Carry a cellphone or wear a medical alert device that you can use to call for help.  Use stepladders instead of chairs to reach high objects. Don't climb if you're at risk for falls. Ask for help, if needed.  Wear the correct eyeglasses, if you need them.        Make your home safer.   Remove rugs, cords, clutter, and furniture from walkways.  Keep your house well lit. Use night-lights in hallways and bathrooms.  Install and use sturdy handrails on stairways.  Wear nonskid footwear, even inside. Don't walk barefoot or in socks without shoes.        Be safe outside.   Use handrails, curb cuts, and ramps whenever possible.  Keep your hands free by using a shoulder bag or backpack.  Try to walk in well-lit areas. Watch out for uneven ground, changes in pavement, and debris.  Be careful in the winter. Walk on the grass or gravel when sidewalks are slippery. Use de-icer on steps and walkways. Add non-slip devices to shoes.    Put grab bars and nonskid mats in your shower or tub and near the toilet. Try to use a shower chair or bath bench when bathing.   Get into a tub or shower by putting in your weaker leg first. Get out with your strong side first. Have a phone or medical alert device in the bathroom with you.   Where can you learn more?  Go to https://www.[employer].net/patiented  Enter G117 in the search box to learn more about \"Preventing Falls: Care Instructions.\"  Current as of: " July 17, 2023  Content Version: 14.2 2024 [facility].   Care instructions adapted under license by your healthcare professional. If you have questions about a medical condition or this instruction, always ask your healthcare professional. [employer] disclaims any warranty or liability for your use of this information.    Hearing Loss: Care Instructions  Overview     Hearing loss is a sudden or slow decrease in how well you hear. It can range from slight to profound. Permanent hearing loss can occur with aging. It also can happen when you are exposed long-term to loud noise. Examples include listening to loud music, riding motorcycles, or being around other loud machines.  Hearing loss can affect your work and home life. It can make you feel lonely or depressed. You may feel that you have lost your independence. But hearing aids and other devices can help you hear better and feel connected to others.  Follow-up care is a key part of your treatment and safety. Be sure to make and go to all appointments, and call your doctor if you are having problems. It's also a good idea to know your test results and keep a list of the medicines you take.  How can you care for yourself at home?  Avoid loud noises whenever possible. This helps keep your hearing from getting worse.  Always wear hearing protection around loud noises.  Wear a hearing aid as directed.  A professional can help you pick a hearing aid that will work best for you.  You can also get hearing aids over the counter for mild to moderate hearing loss.  Have hearing tests as your doctor suggests. They can show whether your hearing has changed. Your hearing aid may need to be adjusted.  Use other devices as needed. These may include:  Telephone amplifiers and hearing aids that can connect to a television, stereo, radio, or microphone.  Devices that use lights or vibrations. These alert you to the doorbell, a ringing telephone, or a baby  "monitor.  Television closed-captioning. This shows the words at the bottom of the screen. Most new TVs can do this.  TTY (text telephone). This lets you type messages back and forth on the telephone instead of talking or listening. These devices are also called TDD. When messages are typed on the keyboard, they are sent over the phone line to a receiving TTY. The message is shown on a monitor.  Use text messaging, social media, and email if it is hard for you to communicate by telephone.  Try to learn a listening technique called speechreading. It is not lipreading. You pay attention to people's gestures, expressions, posture, and tone of voice. These clues can help you understand what a person is saying. Face the person you are talking to, and have them face you. Make sure the lighting is good. You need to see the other person's face clearly.  Think about counseling if you need help to adjust to your hearing loss.  When should you call for help?  Watch closely for changes in your health, and be sure to contact your doctor if:    You think your hearing is getting worse.     You have new symptoms, such as dizziness or nausea.   Where can you learn more?  Go to https://www.[employer].net/patiented  Enter R798 in the search box to learn more about \"Hearing Loss: Care Instructions.\"  Current as of: September 27, 2023  Content Version: 14.2 2024 [facility] [employer].   Care instructions adapted under license by your healthcare professional. If you have questions about a medical condition or this instruction, always ask your healthcare professional. [employer] disclaims any warranty or liability for your use of this information.       "

## 2025-04-25 ENCOUNTER — HOSPITAL ENCOUNTER (OUTPATIENT)
Dept: MAMMOGRAPHY | Facility: CLINIC | Age: 70
Discharge: HOME | End: 2025-04-25
Attending: INTERNAL MEDICINE | Admitting: INTERNAL MEDICINE
Payer: MEDICARE

## 2025-04-25 DIAGNOSIS — Z12.31 VISIT FOR SCREENING MAMMOGRAM: ICD-10-CM

## 2025-04-25 PROCEDURE — 77063 BREAST TOMOSYNTHESIS BI: CPT

## 2025-07-03 ENCOUNTER — TRANSFERRED RECORDS (OUTPATIENT)
Dept: HEALTH INFORMATION MANAGEMENT | Facility: CLINIC | Age: 70
End: 2025-07-03
Payer: MEDICARE